# Patient Record
Sex: MALE | Race: WHITE
[De-identification: names, ages, dates, MRNs, and addresses within clinical notes are randomized per-mention and may not be internally consistent; named-entity substitution may affect disease eponyms.]

---

## 2018-02-17 ENCOUNTER — HOSPITAL ENCOUNTER (INPATIENT)
Dept: HOSPITAL 54 - ER | Age: 83
LOS: 5 days | Discharge: HOME | DRG: 840 | End: 2018-02-22
Attending: NURSE PRACTITIONER | Admitting: NURSE PRACTITIONER
Payer: MEDICARE

## 2018-02-17 VITALS — SYSTOLIC BLOOD PRESSURE: 113 MMHG | DIASTOLIC BLOOD PRESSURE: 60 MMHG

## 2018-02-17 VITALS — BODY MASS INDEX: 24.26 KG/M2 | WEIGHT: 189 LBS | HEIGHT: 74 IN

## 2018-02-17 VITALS — DIASTOLIC BLOOD PRESSURE: 60 MMHG | SYSTOLIC BLOOD PRESSURE: 110 MMHG

## 2018-02-17 VITALS — SYSTOLIC BLOOD PRESSURE: 135 MMHG | DIASTOLIC BLOOD PRESSURE: 69 MMHG

## 2018-02-17 VITALS — SYSTOLIC BLOOD PRESSURE: 112 MMHG | DIASTOLIC BLOOD PRESSURE: 62 MMHG

## 2018-02-17 VITALS — SYSTOLIC BLOOD PRESSURE: 129 MMHG | DIASTOLIC BLOOD PRESSURE: 73 MMHG

## 2018-02-17 VITALS — SYSTOLIC BLOOD PRESSURE: 131 MMHG | DIASTOLIC BLOOD PRESSURE: 63 MMHG

## 2018-02-17 DIAGNOSIS — K80.20: ICD-10-CM

## 2018-02-17 DIAGNOSIS — Z85.51: ICD-10-CM

## 2018-02-17 DIAGNOSIS — T38.0X5A: ICD-10-CM

## 2018-02-17 DIAGNOSIS — J96.21: ICD-10-CM

## 2018-02-17 DIAGNOSIS — Z86.718: ICD-10-CM

## 2018-02-17 DIAGNOSIS — C94.6: Primary | ICD-10-CM

## 2018-02-17 DIAGNOSIS — I25.10: ICD-10-CM

## 2018-02-17 DIAGNOSIS — I70.0: ICD-10-CM

## 2018-02-17 DIAGNOSIS — Z92.21: ICD-10-CM

## 2018-02-17 DIAGNOSIS — I26.99: ICD-10-CM

## 2018-02-17 DIAGNOSIS — N40.0: ICD-10-CM

## 2018-02-17 DIAGNOSIS — Z79.01: ICD-10-CM

## 2018-02-17 DIAGNOSIS — K21.9: ICD-10-CM

## 2018-02-17 DIAGNOSIS — E83.42: ICD-10-CM

## 2018-02-17 DIAGNOSIS — Z79.899: ICD-10-CM

## 2018-02-17 DIAGNOSIS — I10: ICD-10-CM

## 2018-02-17 DIAGNOSIS — E44.0: ICD-10-CM

## 2018-02-17 DIAGNOSIS — Z86.711: ICD-10-CM

## 2018-02-17 DIAGNOSIS — Z87.891: ICD-10-CM

## 2018-02-17 DIAGNOSIS — E78.5: ICD-10-CM

## 2018-02-17 DIAGNOSIS — J20.9: ICD-10-CM

## 2018-02-17 DIAGNOSIS — J43.9: ICD-10-CM
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
## 2018-02-17 LAB
ALBUMIN SERPL BCP-MCNC: 3.2 G/DL (ref 3.4–5)
ALP SERPL-CCNC: 72 U/L (ref 46–116)
ALT SERPL W P-5'-P-CCNC: 18 U/L (ref 12–78)
APPEARANCE UR: CLEAR
APTT PPP: 26 SEC (ref 23–34)
AST SERPL W P-5'-P-CCNC: 16 U/L (ref 15–37)
BASE EXCESS BLDA CALC-SCNC: 1.7 MMOL/L
BASOPHILS # BLD AUTO: 0 /CMM (ref 0–0.2)
BASOPHILS NFR BLD AUTO: 0.1 % (ref 0–2)
BILIRUB DIRECT SERPL-MCNC: 0.2 MG/DL (ref 0–0.2)
BILIRUB SERPL-MCNC: 1.1 MG/DL (ref 0.2–1)
BILIRUB UR QL STRIP: NEGATIVE
BUN SERPL-MCNC: 17 MG/DL (ref 7–18)
CALCIUM SERPL-MCNC: 8.8 MG/DL (ref 8.5–10.1)
CHLORIDE SERPL-SCNC: 108 MMOL/L (ref 98–107)
CO2 SERPL-SCNC: 27 MMOL/L (ref 21–32)
COLOR UR: (no result)
CREAT SERPL-MCNC: 0.9 MG/DL (ref 0.6–1.3)
DO-HGB MFR BLDA: 203.4 MMHG
EOSINOPHIL # BLD AUTO: 0.2 /CMM (ref 0–0.7)
EOSINOPHIL NFR BLD AUTO: 1.2 % (ref 0–6)
EOSINOPHIL NFR BLD MANUAL: 1 % (ref 0–4)
GLUCOSE SERPL-MCNC: 118 MG/DL (ref 74–106)
GLUCOSE UR STRIP-MCNC: NEGATIVE MG/DL
HCT VFR BLD AUTO: 50 % (ref 39–51)
HGB BLD-MCNC: 16.5 G/DL (ref 13.5–17.5)
HGB UR QL STRIP: NEGATIVE ERY/UL
INHALED O2 CONCENTRATION: 44 %
INHALED O2 FLOW RATE: 6 L/MIN (ref 0–30)
INR PPP: 1.12 (ref 0.87–1.13)
KETONES UR STRIP-MCNC: (no result) MG/DL
LEUKOCYTE ESTERASE UR QL STRIP: NEGATIVE
LYMPHOCYTES NFR BLD AUTO: 0.6 /CMM (ref 0.8–4.8)
LYMPHOCYTES NFR BLD AUTO: 4.3 % (ref 20–44)
LYMPHOCYTES NFR BLD MANUAL: 3 % (ref 16–48)
MAGNESIUM SERPL-MCNC: 1.7 MG/DL (ref 1.8–2.4)
MCH RBC QN AUTO: 31 PG (ref 26–33)
MCHC RBC AUTO-ENTMCNC: 33 G/DL (ref 31–36)
MCV RBC AUTO: 93 FL (ref 80–96)
MONOCYTES NFR BLD AUTO: 0.1 /CMM (ref 0.1–1.3)
MONOCYTES NFR BLD AUTO: 0.9 % (ref 2–12)
MONOCYTES NFR BLD MANUAL: 1 % (ref 0–11)
NEUTROPHILS # BLD AUTO: 12.2 /CMM (ref 1.8–8.9)
NEUTROPHILS NFR BLD AUTO: 93.5 % (ref 43–81)
NEUTS BAND NFR BLD MANUAL: 10 % (ref 0–5)
NEUTS SEG NFR BLD MANUAL: 85 % (ref 42–76)
NITRITE UR QL STRIP: NEGATIVE
PCO2 TEMP ADJ BLDA: 34.7 MMHG (ref 35–45)
PH TEMP ADJ BLDA: 7.47 [PH] (ref 7.35–7.45)
PH UR STRIP: 5.5 [PH] (ref 5–8)
PLATELET # BLD AUTO: 388 /CMM (ref 150–450)
PO2 TEMP ADJ BLDA: 70.8 MMHG (ref 75–100)
POTASSIUM SERPL-SCNC: 3.8 MMOL/L (ref 3.5–5.1)
PROT SERPL-MCNC: 6.1 G/DL (ref 6.4–8.2)
PROT UR QL STRIP: NEGATIVE MG/DL
RBC # BLD AUTO: 5.39 MIL/UL (ref 4.5–6)
RBC #/AREA URNS HPF: (no result) /HPF (ref 0–2)
RDW COEFFICIENT OF VARIATION: 16.1 (ref 11.5–15)
SAO2 % BLDA: 94.8 % (ref 92–98.5)
SODIUM SERPL-SCNC: 144 MMOL/L (ref 136–145)
TROPONIN I SERPL-MCNC: < 0.017 NG/ML (ref 0–0.06)
UROBILINOGEN UR STRIP-MCNC: 0.2 EU/DL
VENTILATION MODE VENT: (no result)
WBC #/AREA URNS HPF: (no result) /HPF (ref 0–3)
WBC NRBC COR # BLD AUTO: 13.1 K/UL (ref 4.3–11)

## 2018-02-17 PROCEDURE — A4606 OXYGEN PROBE USED W OXIMETER: HCPCS

## 2018-02-17 PROCEDURE — Z7610: HCPCS

## 2018-02-17 RX ADMIN — ALBUTEROL SULFATE SCH MG: 1.25 SOLUTION RESPIRATORY (INHALATION) at 19:33

## 2018-02-17 RX ADMIN — LATANOPROST SCH DROP: 50 SOLUTION OPHTHALMIC at 22:12

## 2018-02-17 RX ADMIN — ENOXAPARIN SODIUM SCH MG: 80 INJECTION SUBCUTANEOUS at 13:40

## 2018-02-17 RX ADMIN — ALBUTEROL SULFATE SCH MG: 2.5 SOLUTION RESPIRATORY (INHALATION) at 13:07

## 2018-02-17 RX ADMIN — MAGNESIUM SULFATE IN DEXTROSE SCH MLS/HR: 10 INJECTION, SOLUTION INTRAVENOUS at 12:49

## 2018-02-17 RX ADMIN — Medication SCH MG: at 19:32

## 2018-02-17 RX ADMIN — Medication SCH MG: at 08:04

## 2018-02-17 RX ADMIN — DEXTROSE MONOHYDRATE SCH MLS/HR: 50 INJECTION, SOLUTION INTRAVENOUS at 10:09

## 2018-02-17 RX ADMIN — ALBUTEROL SULFATE SCH MG: 1.25 SOLUTION RESPIRATORY (INHALATION) at 15:26

## 2018-02-17 RX ADMIN — DEXTROSE MONOHYDRATE SCH MLS/HR: 50 INJECTION, SOLUTION INTRAVENOUS at 10:59

## 2018-02-17 RX ADMIN — Medication SCH MG: at 13:07

## 2018-02-17 RX ADMIN — Medication SCH MG: at 13:00

## 2018-02-17 RX ADMIN — Medication SCH MG: at 15:26

## 2018-02-17 RX ADMIN — MAGNESIUM SULFATE IN DEXTROSE SCH MLS/HR: 10 INJECTION, SOLUTION INTRAVENOUS at 13:39

## 2018-02-17 RX ADMIN — ALBUTEROL SULFATE SCH MG: 1.25 SOLUTION RESPIRATORY (INHALATION) at 22:59

## 2018-02-17 RX ADMIN — ATORVASTATIN CALCIUM SCH MG: 10 TABLET, FILM COATED ORAL at 22:12

## 2018-02-17 RX ADMIN — ALBUTEROL SULFATE SCH MG: 2.5 SOLUTION RESPIRATORY (INHALATION) at 08:03

## 2018-02-17 RX ADMIN — Medication SCH MG: at 22:59

## 2018-02-17 NOTE — NUR
RN ADMITTING ALMA NOTE

ADMITTED 83 YR OLD MALE, FROM ER REPORT GIVEN BY KINJAL. PT FROM HOME, C/C  SOB X 15 MIN AND 
WHEEZING, ADMIT DIAGNOSIS PNA. PT AOX4 ON SIMPLE MASK @ 4L SAT @ 95%, DENIES ANY PAIN, 
AMBULATORY, NO SKIN ISSUES, WITH RAC 18G, ADMITTING ORDERS ENTERED BY ABHILASH LARA NP ON CALL, 
ASSESSMENT TO BE DONE BY AM SHIFT RN, ALL SAFETY MEASURES UNDER TAKEN CL WR. WILL ENDORSE 
FOR ADONIS.

## 2018-02-17 NOTE — NUR
PT BBRA WITH C/O "SOB [id number]"; EXPIRATORY WHEEZES; BREATHING TX PTA." PT IS 
AAOX4. RESP EVEN AND NONE LABORED. SKIN PINK AND WARM. NO S/S OF ACUTE DISTRESS 
NOTED. VSS. WAITING MD FOR EVAL.

## 2018-02-17 NOTE — NUR
TD RN NOTES



RECEIVED PT ON BED SLEEPING. ALERT ORIENTED X4. ON FACE MASK 4L SATURATING WELL. ON TELE 
MONITOR SR 77. IV ACCESS RA #18 RUNNING WELL. HEAD OF BED ELEVATED SIDE RAILS UP. CALL LIGHT 
WITHIN REACH. WILL CONTINUE TO MONITOR PT CLOSELY.

## 2018-02-17 NOTE — NUR
RN NOTES



RECEIVED PATIENT AWAKE IN BED WITH NO RESPIRATORY DISTRESS OR SHORTNESS OF BREATH. BREATHING 
EVEN AND UNLABORED. ALERT AND ORIENTED, VERBALLY ABLE TO COMMUNICATE NEEDS. NO COMPLAINT OF 
PAIN OR DISCOMFORT. ON O2 AT 6LPM VIA NC WELL TOLERATED. AMBULATORY. VITAL SIGNS WNL. KEPT 
CLEAN AND DRY. WILL CONTINUE TO MONITOR.

## 2018-02-17 NOTE — NUR
TD RN NOTES



NO ACUTE CHANGES NOTED DURING THE SHIFT. PROVIDED COMFORT AND SAFETY. HEAD OF BED ELEVATED. 
DUE MEDS GIVEN.

## 2018-02-18 VITALS — DIASTOLIC BLOOD PRESSURE: 54 MMHG | SYSTOLIC BLOOD PRESSURE: 119 MMHG

## 2018-02-18 VITALS — DIASTOLIC BLOOD PRESSURE: 79 MMHG | SYSTOLIC BLOOD PRESSURE: 149 MMHG

## 2018-02-18 VITALS — DIASTOLIC BLOOD PRESSURE: 64 MMHG | SYSTOLIC BLOOD PRESSURE: 117 MMHG

## 2018-02-18 VITALS — DIASTOLIC BLOOD PRESSURE: 85 MMHG | SYSTOLIC BLOOD PRESSURE: 167 MMHG

## 2018-02-18 VITALS — DIASTOLIC BLOOD PRESSURE: 72 MMHG | SYSTOLIC BLOOD PRESSURE: 156 MMHG

## 2018-02-18 VITALS — SYSTOLIC BLOOD PRESSURE: 152 MMHG | DIASTOLIC BLOOD PRESSURE: 81 MMHG

## 2018-02-18 LAB
BASOPHILS # BLD AUTO: 0 /CMM (ref 0–0.2)
BASOPHILS NFR BLD AUTO: 0 % (ref 0–2)
BUN SERPL-MCNC: 19 MG/DL (ref 7–18)
CALCIUM SERPL-MCNC: 8.8 MG/DL (ref 8.5–10.1)
CHLORIDE SERPL-SCNC: 108 MMOL/L (ref 98–107)
CHOLEST SERPL-MCNC: 123 MG/DL (ref ?–200)
CO2 SERPL-SCNC: 32 MMOL/L (ref 21–32)
CREAT SERPL-MCNC: 1 MG/DL (ref 0.6–1.3)
EOSINOPHIL # BLD AUTO: 0 /CMM (ref 0–0.7)
EOSINOPHIL NFR BLD AUTO: 0 % (ref 0–6)
GLUCOSE SERPL-MCNC: 148 MG/DL (ref 74–106)
HCT VFR BLD AUTO: 49 % (ref 39–51)
HDLC SERPL-MCNC: 50 MG/DL (ref 40–60)
HGB BLD-MCNC: 16.1 G/DL (ref 13.5–17.5)
LDLC SERPL DIRECT ASSAY-MCNC: 70 MG/DL (ref 0–99)
LYMPHOCYTES NFR BLD AUTO: 0.4 /CMM (ref 0.8–4.8)
LYMPHOCYTES NFR BLD AUTO: 2 % (ref 20–44)
LYMPHOCYTES NFR BLD MANUAL: 3 % (ref 16–48)
MAGNESIUM SERPL-MCNC: 2.2 MG/DL (ref 1.8–2.4)
MCH RBC QN AUTO: 31 PG (ref 26–33)
MCHC RBC AUTO-ENTMCNC: 33 G/DL (ref 31–36)
MCV RBC AUTO: 93 FL (ref 80–96)
MONOCYTES NFR BLD AUTO: 0.3 /CMM (ref 0.1–1.3)
MONOCYTES NFR BLD AUTO: 1.5 % (ref 2–12)
MONOCYTES NFR BLD MANUAL: 2 % (ref 0–11)
NEUTROPHILS # BLD AUTO: 18.6 /CMM (ref 1.8–8.9)
NEUTROPHILS NFR BLD AUTO: 96.5 % (ref 43–81)
NEUTS SEG NFR BLD MANUAL: 95 % (ref 42–76)
PHOSPHATE SERPL-MCNC: 3.5 MG/DL (ref 2.5–4.9)
PLATELET # BLD AUTO: 387 /CMM (ref 150–450)
POTASSIUM SERPL-SCNC: 4.2 MMOL/L (ref 3.5–5.1)
RBC # BLD AUTO: 5.25 MIL/UL (ref 4.5–6)
RDW COEFFICIENT OF VARIATION: 16.2 (ref 11.5–15)
SODIUM SERPL-SCNC: 142 MMOL/L (ref 136–145)
TRIGL SERPL-MCNC: 37 MG/DL (ref 30–150)
TSH SERPL DL<=0.005 MIU/L-ACNC: 0.37 UIU/ML (ref 0.36–3.74)
WBC NRBC COR # BLD AUTO: 19.2 K/UL (ref 4.3–11)

## 2018-02-18 RX ADMIN — Medication SCH MG: at 22:47

## 2018-02-18 RX ADMIN — ALBUTEROL SULFATE SCH MG: 1.25 SOLUTION RESPIRATORY (INHALATION) at 03:35

## 2018-02-18 RX ADMIN — ENOXAPARIN SODIUM SCH MG: 80 INJECTION SUBCUTANEOUS at 12:06

## 2018-02-18 RX ADMIN — LOSARTAN POTASSIUM SCH MG: 50 TABLET, FILM COATED ORAL at 08:06

## 2018-02-18 RX ADMIN — FINASTERIDE SCH MG: 5 TABLET, FILM COATED ORAL at 08:06

## 2018-02-18 RX ADMIN — ALBUTEROL SULFATE SCH MG: 1.25 SOLUTION RESPIRATORY (INHALATION) at 15:42

## 2018-02-18 RX ADMIN — Medication SCH MG: at 03:35

## 2018-02-18 RX ADMIN — LATANOPROST SCH DROP: 50 SOLUTION OPHTHALMIC at 21:52

## 2018-02-18 RX ADMIN — ENOXAPARIN SODIUM SCH MG: 80 INJECTION SUBCUTANEOUS at 01:27

## 2018-02-18 RX ADMIN — ALBUTEROL SULFATE SCH MG: 1.25 SOLUTION RESPIRATORY (INHALATION) at 07:16

## 2018-02-18 RX ADMIN — ATORVASTATIN CALCIUM SCH MG: 10 TABLET, FILM COATED ORAL at 21:52

## 2018-02-18 RX ADMIN — Medication SCH MG: at 07:16

## 2018-02-18 RX ADMIN — ALBUTEROL SULFATE SCH MG: 1.25 SOLUTION RESPIRATORY (INHALATION) at 19:31

## 2018-02-18 RX ADMIN — Medication SCH MG: at 15:42

## 2018-02-18 RX ADMIN — HYDROXYUREA SCH MG: 500 CAPSULE ORAL at 08:06

## 2018-02-18 RX ADMIN — ALBUTEROL SULFATE SCH MG: 1.25 SOLUTION RESPIRATORY (INHALATION) at 22:47

## 2018-02-18 RX ADMIN — ALBUTEROL SULFATE SCH MG: 1.25 SOLUTION RESPIRATORY (INHALATION) at 11:31

## 2018-02-18 RX ADMIN — DEXTROSE MONOHYDRATE SCH MLS/HR: 50 INJECTION, SOLUTION INTRAVENOUS at 11:10

## 2018-02-18 RX ADMIN — Medication SCH MG: at 19:31

## 2018-02-18 RX ADMIN — TAMSULOSIN HYDROCHLORIDE SCH MG: 0.4 CAPSULE ORAL at 08:05

## 2018-02-18 RX ADMIN — Medication SCH MG: at 11:31

## 2018-02-18 RX ADMIN — DEXTROSE MONOHYDRATE SCH MLS/HR: 50 INJECTION, SOLUTION INTRAVENOUS at 09:16

## 2018-02-18 NOTE — NUR
TELE RN NOTE 

  DR PAYAN AT BEDSIDE , SEEING PATIENT , ALL NEEDS ATTENDED, WILL CONT TO MONITOR CLOSELY

## 2018-02-18 NOTE — NUR
TELE RN  NOTE

 RESTING COMFORTABLY IN BED, ALL NEEDS ATTENDED, NOT IN ACUTE DISTRESS, WILL CONT  TO 
MONITOR CLOSELY

## 2018-02-18 NOTE — NUR
RN OPENING NOTES



RECEIVED REPORT FROM DAYSHIFT RN. FOUND Pt AWAKE, RESTING IN BED. NO S/S OF ACUTE DISTRESS 
OR SOB NOTED. Pt IS A/OX4, VERBAL, ABLE TO MAKE NEEDS KNOWN. Pt C/O NO PAIN AT THIS TIME. IV 
ACCESS ON MARQUITA MIDLINE & RA #18G, SL. SAFETY MEASURES IN PLACE. BED LOW, LOCKED, HOB ELEVATED 
SIDE RAILS UP, CALL LIGHT AND BEDSIDE TABLE WITHIN REACH. WILL CONTINUE TO MONITOR Pt 
THROUGHOUT THE NIGHT FOR SAFETY.

## 2018-02-18 NOTE — NUR
TELE RN NOTE 

  HAVING DINNER, DAUGHTER AT BEDSIDE , ALL NEEDS ATTENDED, WILL CONT  TO MONITOR CLOSELY

## 2018-02-18 NOTE — NUR
ANDRES RN NOTES



RECEIVED PATIENT AWAKE IN BED WITH NO RESPIRATORY DISTRESS OR SHORTNESS OF BREATH. BREATHING 
EVEN AND UNLABORED. ALERT AND ORIENTED, VERBALLY ABLE TO COMMUNICATE NEEDS. NO COMPLAINT OF 
PAIN OR DISCOMFORT. ON O2 AT 6LPM VIA NC WELL TOLERATED. SIGNS WNL. KEPT CLEAN AND DRY. WILL 
CONTINUE TO MONITOR. ON TELE MONITOR SR .  LT UPPER ARM MID LINE IN PLACE , RT FA HL INTACT 
,  BED IN LOWEST AND LOCKED POSITION , PLAN OF CARE DISCUSSED WITH PATIENT

## 2018-02-19 VITALS — SYSTOLIC BLOOD PRESSURE: 152 MMHG | DIASTOLIC BLOOD PRESSURE: 81 MMHG

## 2018-02-19 VITALS — DIASTOLIC BLOOD PRESSURE: 83 MMHG | SYSTOLIC BLOOD PRESSURE: 160 MMHG

## 2018-02-19 VITALS — DIASTOLIC BLOOD PRESSURE: 76 MMHG | SYSTOLIC BLOOD PRESSURE: 146 MMHG

## 2018-02-19 VITALS — SYSTOLIC BLOOD PRESSURE: 140 MMHG | DIASTOLIC BLOOD PRESSURE: 71 MMHG

## 2018-02-19 VITALS — SYSTOLIC BLOOD PRESSURE: 145 MMHG | DIASTOLIC BLOOD PRESSURE: 73 MMHG

## 2018-02-19 VITALS — DIASTOLIC BLOOD PRESSURE: 73 MMHG | SYSTOLIC BLOOD PRESSURE: 145 MMHG

## 2018-02-19 VITALS — SYSTOLIC BLOOD PRESSURE: 138 MMHG | DIASTOLIC BLOOD PRESSURE: 73 MMHG

## 2018-02-19 LAB
BASOPHILS # BLD AUTO: 0 /CMM (ref 0–0.2)
BASOPHILS NFR BLD AUTO: 0.2 % (ref 0–2)
BUN SERPL-MCNC: 22 MG/DL (ref 7–18)
CALCIUM SERPL-MCNC: 8.5 MG/DL (ref 8.5–10.1)
CHLORIDE SERPL-SCNC: 109 MMOL/L (ref 98–107)
CO2 SERPL-SCNC: 27 MMOL/L (ref 21–32)
CREAT SERPL-MCNC: 0.9 MG/DL (ref 0.6–1.3)
EOSINOPHIL # BLD AUTO: 0 /CMM (ref 0–0.7)
EOSINOPHIL NFR BLD AUTO: 0 % (ref 0–6)
GLUCOSE SERPL-MCNC: 116 MG/DL (ref 74–106)
HCT VFR BLD AUTO: 49 % (ref 39–51)
HGB BLD-MCNC: 16.1 G/DL (ref 13.5–17.5)
LYMPHOCYTES NFR BLD AUTO: 0.5 /CMM (ref 0.8–4.8)
LYMPHOCYTES NFR BLD AUTO: 2.7 % (ref 20–44)
LYMPHOCYTES NFR BLD MANUAL: 3 % (ref 16–48)
MAGNESIUM SERPL-MCNC: 2.1 MG/DL (ref 1.8–2.4)
MCH RBC QN AUTO: 31 PG (ref 26–33)
MCHC RBC AUTO-ENTMCNC: 33 G/DL (ref 31–36)
MCV RBC AUTO: 93 FL (ref 80–96)
MONOCYTES NFR BLD AUTO: 0.4 /CMM (ref 0.1–1.3)
MONOCYTES NFR BLD AUTO: 2.3 % (ref 2–12)
MONOCYTES NFR BLD MANUAL: 2 % (ref 0–11)
NEUTROPHILS # BLD AUTO: 18.4 /CMM (ref 1.8–8.9)
NEUTROPHILS NFR BLD AUTO: 94.8 % (ref 43–81)
NEUTS SEG NFR BLD MANUAL: 95 % (ref 42–76)
PHOSPHATE SERPL-MCNC: 3.8 MG/DL (ref 2.5–4.9)
PLATELET # BLD AUTO: 390 /CMM (ref 150–450)
POTASSIUM SERPL-SCNC: 4.5 MMOL/L (ref 3.5–5.1)
RBC # BLD AUTO: 5.27 MIL/UL (ref 4.5–6)
RDW COEFFICIENT OF VARIATION: 17 (ref 11.5–15)
SODIUM SERPL-SCNC: 143 MMOL/L (ref 136–145)
WBC NRBC COR # BLD AUTO: 19.4 K/UL (ref 4.3–11)

## 2018-02-19 RX ADMIN — HYDROXYUREA SCH MG: 500 CAPSULE ORAL at 08:05

## 2018-02-19 RX ADMIN — ALBUTEROL SULFATE SCH MG: 1.25 SOLUTION RESPIRATORY (INHALATION) at 02:34

## 2018-02-19 RX ADMIN — LOSARTAN POTASSIUM SCH MG: 50 TABLET, FILM COATED ORAL at 08:07

## 2018-02-19 RX ADMIN — TAMSULOSIN HYDROCHLORIDE SCH MG: 0.4 CAPSULE ORAL at 08:05

## 2018-02-19 RX ADMIN — ALBUTEROL SULFATE SCH MG: 1.25 SOLUTION RESPIRATORY (INHALATION) at 14:17

## 2018-02-19 RX ADMIN — Medication SCH MG: at 19:57

## 2018-02-19 RX ADMIN — APIXABAN SCH MG: 5 TABLET, FILM COATED ORAL at 16:34

## 2018-02-19 RX ADMIN — APIXABAN SCH MG: 5 TABLET, FILM COATED ORAL at 11:33

## 2018-02-19 RX ADMIN — Medication SCH MG: at 23:38

## 2018-02-19 RX ADMIN — Medication SCH MG: at 11:12

## 2018-02-19 RX ADMIN — DEXTROSE MONOHYDRATE SCH MLS/HR: 50 INJECTION, SOLUTION INTRAVENOUS at 10:20

## 2018-02-19 RX ADMIN — FINASTERIDE SCH MG: 5 TABLET, FILM COATED ORAL at 08:05

## 2018-02-19 RX ADMIN — ALBUTEROL SULFATE SCH MG: 1.25 SOLUTION RESPIRATORY (INHALATION) at 19:57

## 2018-02-19 RX ADMIN — Medication SCH MG: at 02:34

## 2018-02-19 RX ADMIN — Medication SCH MG: at 07:22

## 2018-02-19 RX ADMIN — ALBUTEROL SULFATE SCH MG: 1.25 SOLUTION RESPIRATORY (INHALATION) at 23:38

## 2018-02-19 RX ADMIN — LATANOPROST SCH DROP: 50 SOLUTION OPHTHALMIC at 22:17

## 2018-02-19 RX ADMIN — ENOXAPARIN SODIUM SCH MG: 80 INJECTION SUBCUTANEOUS at 00:25

## 2018-02-19 RX ADMIN — DEXTROSE MONOHYDRATE SCH MLS/HR: 50 INJECTION, SOLUTION INTRAVENOUS at 11:17

## 2018-02-19 RX ADMIN — ATORVASTATIN CALCIUM SCH MG: 10 TABLET, FILM COATED ORAL at 22:12

## 2018-02-19 RX ADMIN — ALBUTEROL SULFATE SCH MG: 1.25 SOLUTION RESPIRATORY (INHALATION) at 07:22

## 2018-02-19 RX ADMIN — Medication SCH MG: at 14:16

## 2018-02-19 RX ADMIN — ALBUTEROL SULFATE SCH MG: 1.25 SOLUTION RESPIRATORY (INHALATION) at 11:12

## 2018-02-19 NOTE — NUR
RN CLOSING NOTES



NO SIGNIFICANT CHANGES IN Pt's CONDITION. Pt REMAINS STABLE AT THIS TIME. NO S/S OF ACUTE 
DISTRESS OR SOB NOTED. TELE READING SR. ALL NEEDS MET AND ATTENDED TO. SAFETY MEASURES IN 
PLACE. WILL ENDORSE TO DAYSHIFT RN FOR Pt's ADONIS.

## 2018-02-19 NOTE — NUR
MS/RN   Rounds



Patient kept up to date with plan of care. Time allowed to answer all questions and address 
all concerns.

## 2018-02-19 NOTE — NUR
MS/RN   Patient received



Patient received from night shift.

A/X X4, appears in no distress, currently receiving HHN, saturation 95%. Denies any pain or 
discomfort. 

Call light within reach, side rails X2 in upright position, brakes locked. Will continue to 
monitor and ensure safety.

## 2018-02-19 NOTE — NUR
TELE/RN NOTES



RECEIVED REPORT FROM RN URMILA. RECEIVED PT. LYING IN BED RESTING. PT. IS EASILY AROUSABLE 
TO NAME. AWAKE, ALERT AND ORIENTED X4. BREATHING EVEN AND UNLABORED ON 4LPM O2 VIA NC. NO 
SOB, RESPIRATORY DISTRESS OR COMPLAINTS OF PAIN NOTED AT THIS TIME. PT. WITH EXTERNAL 
CARDIAC MONITOR PRESENT AND INTACT. CURRENT RHYTHM = SINUS RHYTHM HR 75. PT. WITH LEFT HAND 
20 GAUGE IV SALINE LOCK PRESENT, PATENT AND INTACT. PT. WITH RIGHT FOREARM IV SALINE LOCK 
PRESENT, PATENT AND INTACT. BED LOCKED AND IN LOWEST POSITION, SIDE RAILS UP X2, CALL LIGHT 
WITHIN REACH, WILL CONTINUE TO MONITOR.

## 2018-02-19 NOTE — NUR
MS/RN   End note



No changes in care at this time, all needs attended. No shortness of breath, saturation has 
remained greater than 94% throughout the shift. All needs attended, will endorse to night 
shift.

## 2018-02-20 VITALS — SYSTOLIC BLOOD PRESSURE: 133 MMHG | DIASTOLIC BLOOD PRESSURE: 84 MMHG

## 2018-02-20 VITALS — DIASTOLIC BLOOD PRESSURE: 80 MMHG | SYSTOLIC BLOOD PRESSURE: 156 MMHG

## 2018-02-20 VITALS — DIASTOLIC BLOOD PRESSURE: 85 MMHG | SYSTOLIC BLOOD PRESSURE: 165 MMHG

## 2018-02-20 VITALS — DIASTOLIC BLOOD PRESSURE: 84 MMHG | SYSTOLIC BLOOD PRESSURE: 162 MMHG

## 2018-02-20 VITALS — DIASTOLIC BLOOD PRESSURE: 79 MMHG | SYSTOLIC BLOOD PRESSURE: 167 MMHG

## 2018-02-20 VITALS — DIASTOLIC BLOOD PRESSURE: 89 MMHG | SYSTOLIC BLOOD PRESSURE: 169 MMHG

## 2018-02-20 VITALS — SYSTOLIC BLOOD PRESSURE: 158 MMHG | DIASTOLIC BLOOD PRESSURE: 82 MMHG

## 2018-02-20 LAB
BASE EXCESS BLDA CALC-SCNC: 1.2 MMOL/L
BUN SERPL-MCNC: 23 MG/DL (ref 7–18)
CALCIUM SERPL-MCNC: 8.6 MG/DL (ref 8.5–10.1)
CHLORIDE SERPL-SCNC: 108 MMOL/L (ref 98–107)
CO2 SERPL-SCNC: 29 MMOL/L (ref 21–32)
CREAT SERPL-MCNC: 0.9 MG/DL (ref 0.6–1.3)
DO-HGB MFR BLDA: 93 MMHG
GLUCOSE SERPL-MCNC: 121 MG/DL (ref 74–106)
INHALED O2 CONCENTRATION: 28 %
INHALED O2 FLOW RATE: 2 L/MIN (ref 0–30)
MAGNESIUM SERPL-MCNC: 1.9 MG/DL (ref 1.8–2.4)
PCO2 TEMP ADJ BLDA: 38 MMHG (ref 35–45)
PH TEMP ADJ BLDA: 7.44 [PH] (ref 7.35–7.45)
PHOSPHATE SERPL-MCNC: 3.7 MG/DL (ref 2.5–4.9)
PO2 TEMP ADJ BLDA: 61.8 MMHG (ref 75–100)
POTASSIUM SERPL-SCNC: 4.3 MMOL/L (ref 3.5–5.1)
SAO2 % BLDA: 92.1 % (ref 92–98.5)
SODIUM SERPL-SCNC: 144 MMOL/L (ref 136–145)
VENTILATION MODE VENT: (no result)

## 2018-02-20 RX ADMIN — HYDROXYUREA SCH MG: 500 CAPSULE ORAL at 08:29

## 2018-02-20 RX ADMIN — Medication SCH MG: at 19:51

## 2018-02-20 RX ADMIN — ALBUTEROL SULFATE SCH MG: 1.25 SOLUTION RESPIRATORY (INHALATION) at 15:30

## 2018-02-20 RX ADMIN — ALBUTEROL SULFATE SCH MG: 1.25 SOLUTION RESPIRATORY (INHALATION) at 07:23

## 2018-02-20 RX ADMIN — LOSARTAN POTASSIUM SCH MG: 50 TABLET, FILM COATED ORAL at 08:31

## 2018-02-20 RX ADMIN — ALBUTEROL SULFATE SCH MG: 1.25 SOLUTION RESPIRATORY (INHALATION) at 23:48

## 2018-02-20 RX ADMIN — ATORVASTATIN CALCIUM SCH MG: 10 TABLET, FILM COATED ORAL at 21:12

## 2018-02-20 RX ADMIN — Medication SCH MG: at 23:48

## 2018-02-20 RX ADMIN — TAMSULOSIN HYDROCHLORIDE SCH MG: 0.4 CAPSULE ORAL at 08:30

## 2018-02-20 RX ADMIN — FINASTERIDE SCH MG: 5 TABLET, FILM COATED ORAL at 08:29

## 2018-02-20 RX ADMIN — Medication SCH MG: at 15:30

## 2018-02-20 RX ADMIN — ALBUTEROL SULFATE SCH MG: 1.25 SOLUTION RESPIRATORY (INHALATION) at 19:51

## 2018-02-20 RX ADMIN — APIXABAN SCH MG: 5 TABLET, FILM COATED ORAL at 17:08

## 2018-02-20 RX ADMIN — Medication SCH MG: at 10:57

## 2018-02-20 RX ADMIN — ALBUTEROL SULFATE SCH MG: 1.25 SOLUTION RESPIRATORY (INHALATION) at 10:57

## 2018-02-20 RX ADMIN — APIXABAN SCH MG: 5 TABLET, FILM COATED ORAL at 08:30

## 2018-02-20 RX ADMIN — LATANOPROST SCH DROP: 50 SOLUTION OPHTHALMIC at 21:13

## 2018-02-20 RX ADMIN — Medication SCH MG: at 07:23

## 2018-02-20 RX ADMIN — DEXTROSE MONOHYDRATE SCH MLS/HR: 50 INJECTION, SOLUTION INTRAVENOUS at 11:19

## 2018-02-20 RX ADMIN — Medication SCH MG: at 03:31

## 2018-02-20 RX ADMIN — DEXTROSE MONOHYDRATE SCH MLS/HR: 50 INJECTION, SOLUTION INTRAVENOUS at 09:24

## 2018-02-20 RX ADMIN — ALBUTEROL SULFATE SCH MG: 1.25 SOLUTION RESPIRATORY (INHALATION) at 03:31

## 2018-02-20 NOTE — NUR
RN NOTE:(INITIAL)



PATIENT RECEIVED ALERT AWAKE ORIENTED X4. ON 4LPM OXYGEN, DENIES SHORTNESS OF BREATH. ON 
TELE MONITOR SINUS RHYTHM. DENIES CHEST PAIN & DISCOMFORT. IV SITE INTACT, SALINE LOCK. 
SAFETY MEASURES OBSERVED. CALL LIGHT WITHIN REACH. WILL CONTINUE TO MONITOR.

## 2018-02-20 NOTE — NUR
TELE RN OPENING NOTES

RECEIVED PT IN BED AWAKE, ALERT, VERBALLY RESPONSIVE. ON O2 VIA N/C AT 2L/MIN, RESPIRATIONS 
EVEN, UNLABORED, NO APPARENT DISTRESS NOTED. SR 84. DENIES ANY PAIN OR DISCOMFORT AT THIS 
TIME. CALL LIGHT WITHIN REACH. ATTENDED ALL NEEDS WILL CONTINUE TO MONITOR ACCORDINGLY.

## 2018-02-20 NOTE — NUR
RN NOTE: (SBP >160)



KIMBER N.P. AWARE ABOUT SBP REMAINS >160 DURING SHIFT, NO NEW ORDERS. CONTINUE TO MONITOR.

## 2018-02-20 NOTE — NUR
RN NOTE:



1000:SEEN BY DR. BASHIR AT BEDSIDE, RECEIVED ORDERS TO TITRATE O2 TO 2LPM, MONITOR SPO2. 

10:25:  RECEIVED ABG RESULTS PO2 61.8MMHG ON 2LPM O2 VIA NC. , RELAYED RESULTS TO DR. BASHIR, 
NO NEW ORDERS RECEIVED. CONTINUE TO ON 2LPM O2. 

CONTINUE TO MONITOR CLOSELY.

## 2018-02-21 VITALS — SYSTOLIC BLOOD PRESSURE: 148 MMHG | DIASTOLIC BLOOD PRESSURE: 83 MMHG

## 2018-02-21 VITALS — SYSTOLIC BLOOD PRESSURE: 150 MMHG | DIASTOLIC BLOOD PRESSURE: 79 MMHG

## 2018-02-21 VITALS — DIASTOLIC BLOOD PRESSURE: 64 MMHG | SYSTOLIC BLOOD PRESSURE: 124 MMHG

## 2018-02-21 VITALS — SYSTOLIC BLOOD PRESSURE: 139 MMHG | DIASTOLIC BLOOD PRESSURE: 75 MMHG

## 2018-02-21 VITALS — DIASTOLIC BLOOD PRESSURE: 83 MMHG | SYSTOLIC BLOOD PRESSURE: 148 MMHG

## 2018-02-21 VITALS — SYSTOLIC BLOOD PRESSURE: 138 MMHG | DIASTOLIC BLOOD PRESSURE: 84 MMHG

## 2018-02-21 VITALS — DIASTOLIC BLOOD PRESSURE: 84 MMHG | SYSTOLIC BLOOD PRESSURE: 139 MMHG

## 2018-02-21 LAB
BASOPHILS # BLD AUTO: 0 /CMM (ref 0–0.2)
BASOPHILS NFR BLD AUTO: 0 % (ref 0–2)
BUN SERPL-MCNC: 28 MG/DL (ref 7–18)
CALCIUM SERPL-MCNC: 8.8 MG/DL (ref 8.5–10.1)
CHLORIDE SERPL-SCNC: 109 MMOL/L (ref 98–107)
CO2 SERPL-SCNC: 29 MMOL/L (ref 21–32)
CREAT SERPL-MCNC: 0.9 MG/DL (ref 0.6–1.3)
EOSINOPHIL # BLD AUTO: 0 /CMM (ref 0–0.7)
EOSINOPHIL NFR BLD AUTO: 0 % (ref 0–6)
GLUCOSE SERPL-MCNC: 124 MG/DL (ref 74–106)
HCT VFR BLD AUTO: 50 % (ref 39–51)
HGB BLD-MCNC: 16.7 G/DL (ref 13.5–17.5)
LYMPHOCYTES NFR BLD AUTO: 0.4 /CMM (ref 0.8–4.8)
LYMPHOCYTES NFR BLD AUTO: 2.4 % (ref 20–44)
LYMPHOCYTES NFR BLD MANUAL: 2 % (ref 16–48)
MCH RBC QN AUTO: 31 PG (ref 26–33)
MCHC RBC AUTO-ENTMCNC: 33 G/DL (ref 31–36)
MCV RBC AUTO: 94 FL (ref 80–96)
MONOCYTES NFR BLD AUTO: 0.3 /CMM (ref 0.1–1.3)
MONOCYTES NFR BLD AUTO: 2.1 % (ref 2–12)
MONOCYTES NFR BLD MANUAL: 5 % (ref 0–11)
NEUTROPHILS # BLD AUTO: 14.5 /CMM (ref 1.8–8.9)
NEUTROPHILS NFR BLD AUTO: 95.5 % (ref 43–81)
NEUTS SEG NFR BLD MANUAL: 93 % (ref 42–76)
PLATELET # BLD AUTO: 340 /CMM (ref 150–450)
POTASSIUM SERPL-SCNC: 4.1 MMOL/L (ref 3.5–5.1)
RBC # BLD AUTO: 5.34 MIL/UL (ref 4.5–6)
RDW COEFFICIENT OF VARIATION: 16.8 (ref 11.5–15)
SODIUM SERPL-SCNC: 145 MMOL/L (ref 136–145)
WBC NRBC COR # BLD AUTO: 15.2 K/UL (ref 4.3–11)

## 2018-02-21 RX ADMIN — ALBUTEROL SULFATE SCH MG: 1.25 SOLUTION RESPIRATORY (INHALATION) at 15:23

## 2018-02-21 RX ADMIN — ATORVASTATIN CALCIUM SCH MG: 10 TABLET, FILM COATED ORAL at 21:12

## 2018-02-21 RX ADMIN — ALBUTEROL SULFATE SCH MG: 1.25 SOLUTION RESPIRATORY (INHALATION) at 07:37

## 2018-02-21 RX ADMIN — Medication SCH MG: at 19:48

## 2018-02-21 RX ADMIN — APIXABAN SCH MG: 5 TABLET, FILM COATED ORAL at 16:04

## 2018-02-21 RX ADMIN — Medication SCH MG: at 23:11

## 2018-02-21 RX ADMIN — Medication SCH MG: at 07:37

## 2018-02-21 RX ADMIN — Medication SCH MG: at 11:22

## 2018-02-21 RX ADMIN — LATANOPROST SCH DROP: 50 SOLUTION OPHTHALMIC at 21:12

## 2018-02-21 RX ADMIN — DEXTROSE MONOHYDRATE SCH MLS/HR: 50 INJECTION, SOLUTION INTRAVENOUS at 10:14

## 2018-02-21 RX ADMIN — APIXABAN SCH MG: 5 TABLET, FILM COATED ORAL at 09:04

## 2018-02-21 RX ADMIN — ALBUTEROL SULFATE SCH MG: 1.25 SOLUTION RESPIRATORY (INHALATION) at 23:11

## 2018-02-21 RX ADMIN — HYDROXYUREA SCH MG: 500 CAPSULE ORAL at 09:04

## 2018-02-21 RX ADMIN — ALBUTEROL SULFATE SCH MG: 1.25 SOLUTION RESPIRATORY (INHALATION) at 03:18

## 2018-02-21 RX ADMIN — Medication SCH MG: at 03:18

## 2018-02-21 RX ADMIN — TAMSULOSIN HYDROCHLORIDE SCH MG: 0.4 CAPSULE ORAL at 09:04

## 2018-02-21 RX ADMIN — Medication SCH MG: at 15:23

## 2018-02-21 RX ADMIN — FINASTERIDE SCH MG: 5 TABLET, FILM COATED ORAL at 09:04

## 2018-02-21 RX ADMIN — LOSARTAN POTASSIUM SCH MG: 50 TABLET, FILM COATED ORAL at 09:04

## 2018-02-21 RX ADMIN — DEXTROSE MONOHYDRATE SCH MLS/HR: 50 INJECTION, SOLUTION INTRAVENOUS at 09:04

## 2018-02-21 RX ADMIN — ALBUTEROL SULFATE SCH MG: 1.25 SOLUTION RESPIRATORY (INHALATION) at 11:22

## 2018-02-21 RX ADMIN — ALBUTEROL SULFATE SCH MG: 1.25 SOLUTION RESPIRATORY (INHALATION) at 19:48

## 2018-02-21 NOTE — NUR
TELE RN NOTES



RECEIVED PT ON BED SLEEPING. ALERT ORIENTED X4. ON NASAL CANNULA 2LPM SATURATING WELL. NO 
SIGN OF RESPI DISTRESS. ON TELE MONITOR SR 60. IV ACCESS ON LEFT HAND #20 SL NO REDNESS OR 
SWELLING. HEAD OF BED ELEVATED. SIDE RAILS UP. CALL LIGHT WITHIN REACH. WILL MONITOR PT 
CLOSELY.

## 2018-02-21 NOTE — NUR
TELE RN CLOSING NOTES

PT IN BED, RESTING COMFORTABLY, ON O2 VIA N/C, RESPIRATIONS EVEN, UNLABORED. CALL LIGHT 
WITHIN REACH. DENIES ANY PAIN OR DISCOMFORT.ATTENDED ALL NEEDS.WILL CONTINUE TO MONITOR 
ACCORDINGLY.

## 2018-02-21 NOTE — NUR
MS RN NOTES



NO ACUTE CHANGES NOTED DURING SHIFT. DUE MEDS GIVEN. PROVIDED COMFORT AND SAFETY. WILL 
ENDORSE TO THE PM NURSE FOR ADONIS.

## 2018-02-22 VITALS — DIASTOLIC BLOOD PRESSURE: 85 MMHG | SYSTOLIC BLOOD PRESSURE: 170 MMHG

## 2018-02-22 VITALS — DIASTOLIC BLOOD PRESSURE: 85 MMHG | SYSTOLIC BLOOD PRESSURE: 145 MMHG

## 2018-02-22 VITALS — DIASTOLIC BLOOD PRESSURE: 82 MMHG | SYSTOLIC BLOOD PRESSURE: 157 MMHG

## 2018-02-22 VITALS — SYSTOLIC BLOOD PRESSURE: 139 MMHG | DIASTOLIC BLOOD PRESSURE: 64 MMHG

## 2018-02-22 VITALS — SYSTOLIC BLOOD PRESSURE: 124 MMHG | DIASTOLIC BLOOD PRESSURE: 64 MMHG

## 2018-02-22 RX ADMIN — ALBUTEROL SULFATE SCH MG: 1.25 SOLUTION RESPIRATORY (INHALATION) at 12:09

## 2018-02-22 RX ADMIN — LOSARTAN POTASSIUM SCH MG: 50 TABLET, FILM COATED ORAL at 08:27

## 2018-02-22 RX ADMIN — HYDROXYUREA SCH MG: 500 CAPSULE ORAL at 08:27

## 2018-02-22 RX ADMIN — DEXTROSE MONOHYDRATE SCH MLS/HR: 50 INJECTION, SOLUTION INTRAVENOUS at 11:53

## 2018-02-22 RX ADMIN — Medication SCH MG: at 07:35

## 2018-02-22 RX ADMIN — TAMSULOSIN HYDROCHLORIDE SCH MG: 0.4 CAPSULE ORAL at 08:27

## 2018-02-22 RX ADMIN — ALBUTEROL SULFATE SCH MG: 1.25 SOLUTION RESPIRATORY (INHALATION) at 07:35

## 2018-02-22 RX ADMIN — Medication SCH MG: at 15:23

## 2018-02-22 RX ADMIN — Medication SCH MG: at 12:10

## 2018-02-22 RX ADMIN — DEXTROSE MONOHYDRATE SCH MLS/HR: 50 INJECTION, SOLUTION INTRAVENOUS at 10:17

## 2018-02-22 RX ADMIN — ALBUTEROL SULFATE SCH MG: 1.25 SOLUTION RESPIRATORY (INHALATION) at 03:17

## 2018-02-22 RX ADMIN — Medication SCH MG: at 03:16

## 2018-02-22 RX ADMIN — ALBUTEROL SULFATE SCH MG: 1.25 SOLUTION RESPIRATORY (INHALATION) at 15:23

## 2018-02-22 RX ADMIN — FINASTERIDE SCH MG: 5 TABLET, FILM COATED ORAL at 08:28

## 2018-02-22 RX ADMIN — APIXABAN SCH MG: 5 TABLET, FILM COATED ORAL at 08:28

## 2018-02-22 NOTE — NUR
Rn discharge note 

rn reviewed discharge paperwork with patient and daughter , rn explained f/u appointments 
with pcp and hematologist md poe, patient and family verbalized understanding rn attempted 
to call in prescriptions to [facility] pharmacy [facility]. rn awaiting authorization from pharmacy 
patient sent with hard copys of prescription. patient bilateral wrist iv removed without 
isues , no sob noted at this time patient, discharged without issues

## 2018-02-22 NOTE — NUR
MS RN CLOSING NOTES



PT IN BED, ALERT ET ORIENTED. RESPIRATIONS EVEN ET UNLABORED. VITAL SIGNS TABLE, 
AFEBRILE..RESTING COMFORTABLY, NO APPARENT DISTRESS NOTED. WITH  2 UV HEPLOCK. ADMINISTERED 
ALL MEDS AS ORDERED. DENIES PAIN OR DISCOMFORT THE ENTIRE SHIFT.  CALL LIGHT WITHIN REACH. 
KEPT CLEAN AND COMFORTABLE,  NEEDS ALL ATTENDED.  WILL ENDORSE  ACCORDINGLY FOR CONTINUITY 
OF CARE.

## 2019-04-13 VITALS — SYSTOLIC BLOOD PRESSURE: 96 MMHG | DIASTOLIC BLOOD PRESSURE: 44 MMHG

## 2019-04-14 ENCOUNTER — HOSPITAL ENCOUNTER (INPATIENT)
Dept: HOSPITAL 54 - ER | Age: 84
LOS: 5 days | Discharge: HOME HEALTH | DRG: 871 | End: 2019-04-19
Attending: INTERNAL MEDICINE | Admitting: NURSE PRACTITIONER
Payer: MEDICARE

## 2019-04-14 VITALS — HEIGHT: 68 IN | WEIGHT: 194 LBS | BODY MASS INDEX: 29.4 KG/M2

## 2019-04-14 VITALS — DIASTOLIC BLOOD PRESSURE: 44 MMHG | SYSTOLIC BLOOD PRESSURE: 96 MMHG

## 2019-04-14 DIAGNOSIS — N40.0: ICD-10-CM

## 2019-04-14 DIAGNOSIS — E78.5: ICD-10-CM

## 2019-04-14 DIAGNOSIS — I10: ICD-10-CM

## 2019-04-14 DIAGNOSIS — E87.2: ICD-10-CM

## 2019-04-14 DIAGNOSIS — J15.6: ICD-10-CM

## 2019-04-14 DIAGNOSIS — D68.69: ICD-10-CM

## 2019-04-14 DIAGNOSIS — Z99.81: ICD-10-CM

## 2019-04-14 DIAGNOSIS — E83.39: ICD-10-CM

## 2019-04-14 DIAGNOSIS — D45: ICD-10-CM

## 2019-04-14 DIAGNOSIS — Z86.718: ICD-10-CM

## 2019-04-14 DIAGNOSIS — I26.99: ICD-10-CM

## 2019-04-14 DIAGNOSIS — J98.11: ICD-10-CM

## 2019-04-14 DIAGNOSIS — Z86.711: ICD-10-CM

## 2019-04-14 DIAGNOSIS — D53.9: ICD-10-CM

## 2019-04-14 DIAGNOSIS — Z85.51: ICD-10-CM

## 2019-04-14 DIAGNOSIS — Z79.01: ICD-10-CM

## 2019-04-14 DIAGNOSIS — N17.0: ICD-10-CM

## 2019-04-14 DIAGNOSIS — J96.21: ICD-10-CM

## 2019-04-14 DIAGNOSIS — J44.0: ICD-10-CM

## 2019-04-14 DIAGNOSIS — Z87.891: ICD-10-CM

## 2019-04-14 DIAGNOSIS — E87.6: ICD-10-CM

## 2019-04-14 DIAGNOSIS — Z92.21: ICD-10-CM

## 2019-04-14 DIAGNOSIS — K21.9: ICD-10-CM

## 2019-04-14 DIAGNOSIS — A41.9: Primary | ICD-10-CM

## 2019-04-14 DIAGNOSIS — D72.829: ICD-10-CM

## 2019-04-14 LAB
BASE EXCESS BLDA CALC-SCNC: -3.9 MMOL/L
BASOPHILS # BLD AUTO: 0 /CMM (ref 0–0.2)
BASOPHILS NFR BLD AUTO: 0.4 % (ref 0–2)
BUN SERPL-MCNC: 18 MG/DL (ref 7–18)
CALCIUM SERPL-MCNC: 8.6 MG/DL (ref 8.5–10.1)
CHLORIDE SERPL-SCNC: 107 MMOL/L (ref 98–107)
CO2 SERPL-SCNC: 31 MMOL/L (ref 21–32)
CREAT SERPL-MCNC: 1.3 MG/DL (ref 0.6–1.3)
DO-HGB MFR BLDA: 162.1 MMHG
EOSINOPHIL NFR BLD AUTO: 1 % (ref 0–6)
GLUCOSE SERPL-MCNC: 126 MG/DL (ref 74–106)
HCT VFR BLD AUTO: 50 % (ref 39–51)
HGB BLD-MCNC: 16.2 G/DL (ref 13.5–17.5)
INHALED O2 CONCENTRATION: 36 %
LYMPHOCYTES NFR BLD AUTO: 0.8 /CMM (ref 0.8–4.8)
LYMPHOCYTES NFR BLD AUTO: 7.4 % (ref 20–44)
MCHC RBC AUTO-ENTMCNC: 33 G/DL (ref 31–36)
MCV RBC AUTO: 108 FL (ref 80–96)
MONOCYTES NFR BLD AUTO: 0.1 /CMM (ref 0.1–1.3)
MONOCYTES NFR BLD AUTO: 0.5 % (ref 2–12)
NEUTROPHILS # BLD AUTO: 10.4 /CMM (ref 1.8–8.9)
NEUTROPHILS NFR BLD AUTO: 90.7 % (ref 43–81)
PCO2 TEMP ADJ BLDA: 25.9 MMHG (ref 35–45)
PH TEMP ADJ BLDA: 7.46 [PH] (ref 7.35–7.45)
PLATELET # BLD AUTO: 383 /CMM (ref 150–450)
PO2 TEMP ADJ BLDA: 51.4 MMHG (ref 75–100)
POTASSIUM SERPL-SCNC: 3.9 MMOL/L (ref 3.5–5.1)
RBC # BLD AUTO: 4.6 MIL/UL (ref 4.5–6)
SAO2 % BLDA: 87.9 % (ref 92–98.5)
SODIUM SERPL-SCNC: 144 MMOL/L (ref 136–145)
WBC NRBC COR # BLD AUTO: 11.4 K/UL (ref 4.3–11)

## 2019-04-14 PROCEDURE — G0378 HOSPITAL OBSERVATION PER HR: HCPCS

## 2019-04-14 NOTE — NUR
PT DANNY FROM HOME C/O SOB X1 HR. PT REC'D BREATHING TREATMENT EN ROUTE, MILD 
RELIEF. O2 SAT 88% ROOM AIR, PLACED ON 4L NC O2 SAT 94%. PT AAOX4. NOTED 
TACHYCARDIA, MD AWARE. SKIN WARM AND INTACT. NO ACUTE DISTRESS NOTED AT THIS 
TIME. PLACED IN GOWN AND ON CONTINUOUS CARDIAC MONITOR AND PULSE OX. WILL 
CONTINUE TO MONITOR.

## 2019-04-14 NOTE — NUR
-------------------------------------------------------------------------------

          *** Note undone in [facility] - 04/14/19 at 2354 by QI ***           

-------------------------------------------------------------------------------

RADIOLOGY AT BEDSIDE FOR CXR

## 2019-04-15 VITALS — SYSTOLIC BLOOD PRESSURE: 118 MMHG | DIASTOLIC BLOOD PRESSURE: 53 MMHG

## 2019-04-15 VITALS — DIASTOLIC BLOOD PRESSURE: 69 MMHG | SYSTOLIC BLOOD PRESSURE: 96 MMHG

## 2019-04-15 VITALS — SYSTOLIC BLOOD PRESSURE: 95 MMHG | DIASTOLIC BLOOD PRESSURE: 44 MMHG

## 2019-04-15 VITALS — SYSTOLIC BLOOD PRESSURE: 109 MMHG | DIASTOLIC BLOOD PRESSURE: 49 MMHG

## 2019-04-15 VITALS — DIASTOLIC BLOOD PRESSURE: 67 MMHG | SYSTOLIC BLOOD PRESSURE: 98 MMHG

## 2019-04-15 VITALS — SYSTOLIC BLOOD PRESSURE: 91 MMHG | DIASTOLIC BLOOD PRESSURE: 57 MMHG

## 2019-04-15 LAB
ALBUMIN SERPL BCP-MCNC: 3.4 G/DL (ref 3.4–5)
ALP SERPL-CCNC: 102 U/L (ref 46–116)
ALT SERPL W P-5'-P-CCNC: 23 U/L (ref 12–78)
APPEARANCE UR: CLEAR
AST SERPL W P-5'-P-CCNC: 19 U/L (ref 15–37)
BASOPHILS # BLD AUTO: 0.1 /CMM (ref 0–0.2)
BASOPHILS NFR BLD AUTO: 0.4 % (ref 0–2)
BILIRUB DIRECT SERPL-MCNC: 0.2 MG/DL (ref 0–0.2)
BILIRUB SERPL-MCNC: 0.6 MG/DL (ref 0.2–1)
BILIRUB UR QL STRIP: NEGATIVE
BUN SERPL-MCNC: 17 MG/DL (ref 7–18)
CALCIUM SERPL-MCNC: 7.7 MG/DL (ref 8.5–10.1)
CHLORIDE SERPL-SCNC: 109 MMOL/L (ref 98–107)
CHOLEST SERPL-MCNC: 93 MG/DL (ref ?–200)
CO2 SERPL-SCNC: 24 MMOL/L (ref 21–32)
COLOR UR: YELLOW
CREAT SERPL-MCNC: 1.1 MG/DL (ref 0.6–1.3)
CREAT UR-MCNC: 128.7 MG/DL (ref 30–125)
EOSINOPHIL NFR BLD AUTO: 0 % (ref 0–6)
GLUCOSE SERPL-MCNC: 125 MG/DL (ref 74–106)
GLUCOSE UR STRIP-MCNC: NEGATIVE MG/DL
HCT VFR BLD AUTO: 40 % (ref 39–51)
HDLC SERPL-MCNC: 44 MG/DL (ref 40–60)
HGB BLD-MCNC: 13 G/DL (ref 13.5–17.5)
HGB UR QL STRIP: (no result) ERY/UL
KETONES UR STRIP-MCNC: NEGATIVE MG/DL
LDLC SERPL DIRECT ASSAY-MCNC: 50 MG/DL (ref 0–99)
LEUKOCYTE ESTERASE UR QL STRIP: NEGATIVE
LYMPHOCYTES NFR BLD AUTO: 0.3 /CMM (ref 0.8–4.8)
LYMPHOCYTES NFR BLD AUTO: 1.7 % (ref 20–44)
MAGNESIUM SERPL-MCNC: 1.8 MG/DL (ref 1.8–2.4)
MCHC RBC AUTO-ENTMCNC: 33 G/DL (ref 31–36)
MCV RBC AUTO: 107 FL (ref 80–96)
MONOCYTES NFR BLD AUTO: 1.5 /CMM (ref 0.1–1.3)
MONOCYTES NFR BLD AUTO: 7.5 % (ref 2–12)
NEUTROPHILS # BLD AUTO: 18.1 /CMM (ref 1.8–8.9)
NEUTROPHILS NFR BLD AUTO: 90.4 % (ref 43–81)
NITRITE UR QL STRIP: NEGATIVE
NT-PROBNP SERPL-MCNC: 167 PG/ML (ref 0–125)
PH UR STRIP: 6 [PH] (ref 5–8)
PHOSPHATE SERPL-MCNC: 1.9 MG/DL (ref 2.5–4.9)
PLATELET # BLD AUTO: 308 /CMM (ref 150–450)
POTASSIUM SERPL-SCNC: 3.4 MMOL/L (ref 3.5–5.1)
PROT SERPL-MCNC: 6.8 G/DL (ref 6.4–8.2)
PROT UR QL STRIP: NEGATIVE MG/DL
PROT UR-MCNC: 21.1 MG/DL (ref 0–11.9)
RBC # BLD AUTO: 3.7 MIL/UL (ref 4.5–6)
SODIUM SERPL-SCNC: 141 MMOL/L (ref 136–145)
SODIUM UR-SCNC: 34 MMOL/L (ref 40–220)
TRIGL SERPL-MCNC: 27 MG/DL (ref 30–150)
UROBILINOGEN UR STRIP-MCNC: 0.2 EU/DL
WBC #/AREA URNS HPF: (no result) /HPF (ref 0–3)
WBC NRBC COR # BLD AUTO: 20 K/UL (ref 4.3–11)

## 2019-04-15 RX ADMIN — APIXABAN SCH MG: 5 TABLET, FILM COATED ORAL at 16:39

## 2019-04-15 RX ADMIN — SODIUM CHLORIDE PRN MLS/HR: 9 INJECTION, SOLUTION INTRAVENOUS at 02:30

## 2019-04-15 RX ADMIN — HYDROXYUREA SCH MG: 500 CAPSULE ORAL at 09:02

## 2019-04-15 RX ADMIN — SODIUM CHLORIDE PRN MLS/HR: 9 INJECTION, SOLUTION INTRAVENOUS at 17:03

## 2019-04-15 RX ADMIN — APIXABAN SCH MG: 5 TABLET, FILM COATED ORAL at 09:02

## 2019-04-15 RX ADMIN — Medication SCH MG: at 15:30

## 2019-04-15 RX ADMIN — ATORVASTATIN CALCIUM SCH MG: 40 TABLET, FILM COATED ORAL at 21:39

## 2019-04-15 RX ADMIN — ALBUTEROL SULFATE SCH MG: 1.25 SOLUTION RESPIRATORY (INHALATION) at 19:30

## 2019-04-15 RX ADMIN — TAMSULOSIN HYDROCHLORIDE SCH MG: 0.4 CAPSULE ORAL at 09:02

## 2019-04-15 RX ADMIN — LATANOPROST SCH ML: 50 SOLUTION OPHTHALMIC at 21:39

## 2019-04-15 RX ADMIN — FINASTERIDE SCH MG: 5 TABLET, FILM COATED ORAL at 09:01

## 2019-04-15 RX ADMIN — ALBUTEROL SULFATE SCH MG: 1.25 SOLUTION RESPIRATORY (INHALATION) at 15:30

## 2019-04-15 RX ADMIN — Medication SCH MG: at 19:30

## 2019-04-15 NOTE — NUR
ALMA RN NOTE

GOT CALL FROM RADIOLOGY,REPORT POSITIVE RESULT FOR BILATERAL  PE. MADE AWARE.GOT NEW 
ORDER FOR D/C ELIQUIS AND TO START ON LOVENOX 1MG/KG SQ Q12H TO START 04/16/19 0500.TO 
RECOMMEND TO HAVE  HEMATOLOGY CONSULTATION .

## 2019-04-15 NOTE — NUR
RN ADMISSION NOTES,



RECEIVED 84 YEAR OLD MALE ADMITTED FROM ER DEPARTMENT VIA STRETCHER ACCOMPANIED BY 2 NURSES, 
NO SOB/ACUTE DISTRESS NOTED AT THIS TIME, UNDER MEDICAL SERVICES WHIT JOSE NP WITH 
ADMITTING DX SEPSIS, H/O COPD, RECENTLY DIAGNOSED WITH PNA,H/O  PE, LEFT KNEE DVT, BLADDER 
CA, HTN, HLD, BPH, ANEMIA, FORMER SMOKER, PATIENT A/O X4 ABLE TO VERBALIZED NEEDS AND 
CONCERNS, ON 4LMP VIA NC WITH O2 SAT LEVEL 91%, 98.2, 91/57, 94, SINUS RHYTHM IN TELE 
MONITOR AMBULATORY WITH ASSISTANCE, SACRAL REDNESS NOTED, AFEBRILE AT THIS TIME,  RIGHT FORE 
ARM IV ACCESS 20G, WILL F/U WITH MD FOR FURTHER ORDERS, ALL NEEDS PROVIDED, BED LOCKED AND 
IN LOWEST POSITION, ORIENTED TO ROOM AND TO USE CALL LIGHT FOR ASSISTANCE, WILL CONTINUE TO 
MONITOR CLOSELY.

## 2019-04-15 NOTE — NUR
ALMA RN OPENING NOTES

RECEIVED REPORT FROM PM NURSE.PATIENT AXOX3.WITH  PERIODS OF FRETFULNESS.NO SOB NO DISTRESS 
NOTED.PATIENT ON O2 VIA NASAL CANULA 4L  .ON TELE MONITOR SR WITH OCCASIONAL PVC HR 76.IV ON 
RFA#20 INTACT AND PATENT WITH IVF.BED IS LOW AND IN  LOCKED POSITION.CALL LIGHT IN 
REACH.SRX3.BED ALARM ON.WILL CONTINUE TO MONITOR.

## 2019-04-15 NOTE — NUR
RN  NOTES,



 PATIENT SLEEPING AT THIS TIME, BREATHING EVEN AND UNLABORED, NO SOB/ACUTE DISTRESS NOTED AT 
THIS TIME, , ON 4LMP VIA NC WITH O2 SAT LEVEL >91%,  SINUS RHYTHM IN TELE MONITOR RIGHT FORE 
ARM IV ACCESS 20G, ALL NEEDS PROVIDED, BED LOCKED AND IN LOWEST POSITION, TO SIGNIFICANT 
CHANGE IN CONDITION DURING THE REST OF THE SHIFT,  CALL LIGHT W/I REACH, WILL ENDORSE 
CONTINUITY OF CARE TO ONCOMING NURSE.

## 2019-04-15 NOTE — NUR
ALMA RN OPENING NOTES

RECEIVED REPORT FROM RD BARRIOS. PATIENT A/A/O X3, ABLE TO MAKE NEEDS KNOWN. BREATHING EVEN & 
UNLABORED, TOLERATING O2 @ 4LPM VIA NC. DENIES SOB OR DIFFICULTY BREATHING. ON TELE W/ SINUS 
RHYTHM, HR 70. RIGHT FOREARM IV #20 INTACT & PATENT W/ DRESSING CDI, SALINE LOCKED. DENIES 
ANY PAIN OR DISCOMFORT @ THIS TIME. SAFETY MEASURES IN PLACE W/ SIDE RAILS & BED ALARM ON. 
INSTRUCTED TO USE CALL LIGHT FOR ASSISTANCE. WILL CONTINUE TO MONITOR.

## 2019-04-15 NOTE — NUR
PT UNABLE TO PROVIDE URINE SAMPLE AT THIS TIME. MD AWARE. PER VERBAL MD ORDER, 
WILL ADMINISTER MAXIPIME 2G IV NOW BEFORE URINE.

## 2019-04-15 NOTE — NUR
ISSAC (DAUGHTER) CONTACT INFORMATION: [phone]

ABHILASH (SON) CONTACT INFORMATION: [phone]

## 2019-04-15 NOTE — NUR
ALMA RN CLOSING NOTES

.PATIENT AXOX3.NO SOB NO DISTRESS NOTED.PATIENT ON O2 VIA NASAL CANULA 4L  .ON TELE MONITOR 
SR WITH OCCASIONAL PVC HR 78.IV ON RFA#20 AND LAC#20 INTACT AND PATENT WITH IVF.BED IS LOW 
AND IN  LOCKED POSITION.CALL LIGHT IN REACH.SRX3.BED ALARM ON.NP NEGRETTE MADE AWARE ABOUT 
POSITIVE PE AND HEMATOLOGY RECOMMENDATION FROM PULMONOLOGIST.AWAITING FOR BILATERAL VENOUS 
DOPPLER RESULT.WILL ENDORSE TO PM NURSE FOR ADONIS.

## 2019-04-16 VITALS — DIASTOLIC BLOOD PRESSURE: 65 MMHG | SYSTOLIC BLOOD PRESSURE: 122 MMHG

## 2019-04-16 VITALS — DIASTOLIC BLOOD PRESSURE: 47 MMHG | SYSTOLIC BLOOD PRESSURE: 96 MMHG

## 2019-04-16 VITALS — SYSTOLIC BLOOD PRESSURE: 111 MMHG | DIASTOLIC BLOOD PRESSURE: 59 MMHG

## 2019-04-16 VITALS — DIASTOLIC BLOOD PRESSURE: 63 MMHG | SYSTOLIC BLOOD PRESSURE: 138 MMHG

## 2019-04-16 VITALS — SYSTOLIC BLOOD PRESSURE: 123 MMHG | DIASTOLIC BLOOD PRESSURE: 60 MMHG

## 2019-04-16 LAB
ALBUMIN SERPL BCP-MCNC: 2.4 G/DL (ref 3.4–5)
ALP SERPL-CCNC: 60 U/L (ref 46–116)
ALT SERPL W P-5'-P-CCNC: 19 U/L (ref 12–78)
AST SERPL W P-5'-P-CCNC: 19 U/L (ref 15–37)
BASE EXCESS BLDA CALC-SCNC: -0.9 MMOL/L
BASOPHILS # BLD AUTO: 0 /CMM (ref 0–0.2)
BASOPHILS NFR BLD AUTO: 0.4 % (ref 0–2)
BILIRUB SERPL-MCNC: 0.6 MG/DL (ref 0.2–1)
BUN SERPL-MCNC: 14 MG/DL (ref 7–18)
CALCIUM SERPL-MCNC: 7.8 MG/DL (ref 8.5–10.1)
CHLORIDE SERPL-SCNC: 108 MMOL/L (ref 98–107)
CK SERPL-CCNC: 41 U/L (ref 39–308)
CO2 SERPL-SCNC: 24 MMOL/L (ref 21–32)
CREAT SERPL-MCNC: 0.8 MG/DL (ref 0.6–1.3)
DO-HGB MFR BLDA: 117.3 MMHG
EOSINOPHIL NFR BLD AUTO: 2.1 % (ref 0–6)
GLUCOSE SERPL-MCNC: 98 MG/DL (ref 74–106)
HCT VFR BLD AUTO: 40 % (ref 39–51)
HGB BLD-MCNC: 13.3 G/DL (ref 13.5–17.5)
INHALED O2 CONCENTRATION: 32 %
INHALED O2 FLOW RATE: 3 L/MIN (ref 0–30)
LYMPHOCYTES NFR BLD AUTO: 0.6 /CMM (ref 0.8–4.8)
LYMPHOCYTES NFR BLD AUTO: 7.5 % (ref 20–44)
MAGNESIUM SERPL-MCNC: 1.8 MG/DL (ref 1.8–2.4)
MCHC RBC AUTO-ENTMCNC: 34 G/DL (ref 31–36)
MCV RBC AUTO: 105 FL (ref 80–96)
MONOCYTES NFR BLD AUTO: 1.1 /CMM (ref 0.1–1.3)
MONOCYTES NFR BLD AUTO: 12.5 % (ref 2–12)
NEUTROPHILS # BLD AUTO: 6.5 /CMM (ref 1.8–8.9)
NEUTROPHILS NFR BLD AUTO: 77.5 % (ref 43–81)
PCO2 TEMP ADJ BLDA: 35.5 MMHG (ref 35–45)
PH TEMP ADJ BLDA: 7.43 [PH] (ref 7.35–7.45)
PHOSPHATE SERPL-MCNC: 2.5 MG/DL (ref 2.5–4.9)
PLATELET # BLD AUTO: 269 /CMM (ref 150–450)
PO2 TEMP ADJ BLDA: 69.3 MMHG (ref 75–100)
POTASSIUM SERPL-SCNC: 3.5 MMOL/L (ref 3.5–5.1)
PROT SERPL-MCNC: 5.2 G/DL (ref 6.4–8.2)
RBC # BLD AUTO: 3.78 MIL/UL (ref 4.5–6)
SAO2 % BLDA: 93.6 % (ref 92–98.5)
SODIUM SERPL-SCNC: 142 MMOL/L (ref 136–145)
TSH SERPL DL<=0.005 MIU/L-ACNC: 2.25 UIU/ML (ref 0.36–3.74)
VENTILATION MODE VENT: (no result)
WBC NRBC COR # BLD AUTO: 8.4 K/UL (ref 4.3–11)

## 2019-04-16 RX ADMIN — ALBUTEROL SULFATE SCH MG: 1.25 SOLUTION RESPIRATORY (INHALATION) at 01:28

## 2019-04-16 RX ADMIN — TAMSULOSIN HYDROCHLORIDE SCH MG: 0.4 CAPSULE ORAL at 08:35

## 2019-04-16 RX ADMIN — Medication SCH MG: at 07:35

## 2019-04-16 RX ADMIN — Medication SCH MG: at 13:30

## 2019-04-16 RX ADMIN — Medication SCH MG: at 19:16

## 2019-04-16 RX ADMIN — ALBUTEROL SULFATE SCH MG: 1.25 SOLUTION RESPIRATORY (INHALATION) at 07:35

## 2019-04-16 RX ADMIN — CEFEPIME HYDROCHLORIDE SCH MLS/HR: 1 INJECTION, POWDER, FOR SOLUTION INTRAMUSCULAR; INTRAVENOUS at 12:16

## 2019-04-16 RX ADMIN — SODIUM CHLORIDE PRN MLS/HR: 9 INJECTION, SOLUTION INTRAVENOUS at 16:21

## 2019-04-16 RX ADMIN — ALBUTEROL SULFATE SCH MG: 1.25 SOLUTION RESPIRATORY (INHALATION) at 15:41

## 2019-04-16 RX ADMIN — ATORVASTATIN CALCIUM SCH MG: 40 TABLET, FILM COATED ORAL at 21:39

## 2019-04-16 RX ADMIN — ENOXAPARIN SODIUM SCH MG: 80 INJECTION SUBCUTANEOUS at 05:21

## 2019-04-16 RX ADMIN — Medication SCH MG: at 01:27

## 2019-04-16 RX ADMIN — HYDROXYUREA SCH MG: 500 CAPSULE ORAL at 08:34

## 2019-04-16 RX ADMIN — ALBUTEROL SULFATE SCH MG: 1.25 SOLUTION RESPIRATORY (INHALATION) at 13:30

## 2019-04-16 RX ADMIN — Medication SCH MG: at 15:40

## 2019-04-16 RX ADMIN — FINASTERIDE SCH MG: 5 TABLET, FILM COATED ORAL at 08:36

## 2019-04-16 RX ADMIN — ALBUTEROL SULFATE SCH MG: 1.25 SOLUTION RESPIRATORY (INHALATION) at 19:16

## 2019-04-16 RX ADMIN — ENOXAPARIN SODIUM SCH MG: 80 INJECTION SUBCUTANEOUS at 16:12

## 2019-04-16 RX ADMIN — LATANOPROST SCH ML: 50 SOLUTION OPHTHALMIC at 21:40

## 2019-04-16 NOTE — NUR
RECEIVED CARE OF PATIENT. A/OX3. IV SITES C/D/I/P WITH IVF RUNNING PER MD ORDER. PATIENT 
DENIES PAIN, SOB, DIFFICULTY BREATHING. BLE EDEMA NOTED AND LEGS ELEVATED ON PILLOWS. 
PATIENT TOLERATING LOW FLOW 02 NS. SAFETY, SKIN, ASPIRATION PRECAUTIONS IN PLACE AND WILL 
MONITOR. TELE NSR.

## 2019-04-16 NOTE — NUR
DR BACH AT BEDSIDE. MD AWARE PATIENT BLOOD CX SHOWING GNR. PER MD REPEAT BC. AND 
PENDING DR INTERIANO CONSULT TO DETERMINE IF WANT PO ANTICOAGS OR FILTER POSSIBLY.

## 2019-04-16 NOTE — NUR
ALL DUE MEDS GIVEN AND ALL NEEDS MET. PATIENT DENIES SOB, DIFFICULTY BREATHING OR PAIN. IVF 
RUNNING PER MD ORDER. IV SITE C/D/I/P; DRESSING CHANGED. TOLERATING LOW FLOW 02 NC. NO S/S 
BLEEDING. NO NEW COMPLAINTS PER PATIENT. SAFETY, SKIN, ASPIRATION PRECAUTIONS IN PLACE AND 
CALL LIGHT IN REACH.

## 2019-04-16 NOTE — NUR
ALMA RN OPENING NOTES

RECEIVED REPORT FROM GUS BARRIOS. PATIENT A/A/O X3, ABLE TO MAKE NEEDS KNOWN. BREATHING EVEN & 
UNLABORED, TOLERATING O2 @ 4LPM VIA NC. BREATHING TX IN PROGRESS. DENIES SOB OR DIFFICULTY 
BREATHING. RADIAL PULSES PRESENT. RIGHT FOREARM & LEFT FOREARM IV #20  INTACT & PATENT W/ 
DRESSING CDI & IVF NS INFUSING WELL @ 75 ML/HR. DENIES ANY PAIN OR DISCOMFORT @ THIS TIME. 
SAFETY MEASURES IN PLACE W/ SIDE RAILS & BED ALARM ON. INSTRUCTED TO USE CALL LIGHT FOR 
ASSISTANCE. WILL CONTINUE TO MONITOR.

## 2019-04-17 VITALS — DIASTOLIC BLOOD PRESSURE: 66 MMHG | SYSTOLIC BLOOD PRESSURE: 115 MMHG

## 2019-04-17 VITALS — SYSTOLIC BLOOD PRESSURE: 132 MMHG | DIASTOLIC BLOOD PRESSURE: 67 MMHG

## 2019-04-17 VITALS — DIASTOLIC BLOOD PRESSURE: 57 MMHG | SYSTOLIC BLOOD PRESSURE: 110 MMHG

## 2019-04-17 VITALS — DIASTOLIC BLOOD PRESSURE: 74 MMHG | SYSTOLIC BLOOD PRESSURE: 130 MMHG

## 2019-04-17 LAB
ALBUMIN SERPL ELPH-MCNC: 2.5 G/DL (ref 2.9–4.4)
ALBUMIN/GLOB SERPL: 1.1 {RATIO} (ref 0.7–1.7)
ALPHA1 GLOB SERPL ELPH-MCNC: 0.3 G/DL (ref 0–0.4)
ALPHA2 GLOB SERPL ELPH-MCNC: 0.5 G/DL (ref 0.4–1)
B-GLOBULIN SERPL ELPH-MCNC: 0.7 G/DL (ref 0.7–1.3)
BUN SERPL-MCNC: 11 MG/DL (ref 7–18)
CALCIUM SERPL-MCNC: 8.1 MG/DL (ref 8.5–10.1)
CHLORIDE SERPL-SCNC: 110 MMOL/L (ref 98–107)
CO2 SERPL-SCNC: 25 MMOL/L (ref 21–32)
CREAT SERPL-MCNC: 0.7 MG/DL (ref 0.6–1.3)
FERRITIN SERPL-MCNC: 45 NG/ML (ref 8–388)
GAMMA GLOB SERPL ELPH-MCNC: 0.6 G/DL (ref 0.4–1.8)
GLOBULIN SER CALC-MCNC: 2.2 G/DL (ref 2.2–3.9)
GLUCOSE SERPL-MCNC: 98 MG/DL (ref 74–106)
IRON SERPL-MCNC: 20 UG/DL (ref 50–175)
POTASSIUM SERPL-SCNC: 3.9 MMOL/L (ref 3.5–5.1)
PTH-INTACT SERPL-MCNC: 57 PG/ML (ref 15–65)
SODIUM SERPL-SCNC: 143 MMOL/L (ref 136–145)
TIBC SERPL-MCNC: 211 UG/DL (ref 250–450)

## 2019-04-17 RX ADMIN — CEFEPIME HYDROCHLORIDE SCH MLS/HR: 1 INJECTION, POWDER, FOR SOLUTION INTRAMUSCULAR; INTRAVENOUS at 01:17

## 2019-04-17 RX ADMIN — Medication SCH MG: at 20:24

## 2019-04-17 RX ADMIN — FINASTERIDE SCH MG: 5 TABLET, FILM COATED ORAL at 09:13

## 2019-04-17 RX ADMIN — CEFEPIME HYDROCHLORIDE SCH MLS/HR: 1 INJECTION, POWDER, FOR SOLUTION INTRAMUSCULAR; INTRAVENOUS at 12:49

## 2019-04-17 RX ADMIN — METRONIDAZOLE SCH MG: 500 TABLET ORAL at 17:27

## 2019-04-17 RX ADMIN — ATORVASTATIN CALCIUM SCH MG: 40 TABLET, FILM COATED ORAL at 22:10

## 2019-04-17 RX ADMIN — Medication SCH MG: at 07:36

## 2019-04-17 RX ADMIN — LATANOPROST SCH ML: 50 SOLUTION OPHTHALMIC at 22:10

## 2019-04-17 RX ADMIN — ALBUTEROL SULFATE SCH MG: 1.25 SOLUTION RESPIRATORY (INHALATION) at 01:01

## 2019-04-17 RX ADMIN — ENOXAPARIN SODIUM SCH MG: 80 INJECTION SUBCUTANEOUS at 17:28

## 2019-04-17 RX ADMIN — Medication SCH MG: at 01:01

## 2019-04-17 RX ADMIN — ENOXAPARIN SODIUM SCH MG: 80 INJECTION SUBCUTANEOUS at 05:07

## 2019-04-17 RX ADMIN — ALBUTEROL SULFATE SCH MG: 1.25 SOLUTION RESPIRATORY (INHALATION) at 20:24

## 2019-04-17 RX ADMIN — Medication SCH MG: at 12:38

## 2019-04-17 RX ADMIN — ALBUTEROL SULFATE SCH MG: 1.25 SOLUTION RESPIRATORY (INHALATION) at 07:34

## 2019-04-17 RX ADMIN — HYDROXYUREA SCH MG: 500 CAPSULE ORAL at 09:14

## 2019-04-17 RX ADMIN — SODIUM CHLORIDE PRN MLS/HR: 9 INJECTION, SOLUTION INTRAVENOUS at 06:10

## 2019-04-17 RX ADMIN — ALBUTEROL SULFATE SCH MG: 1.25 SOLUTION RESPIRATORY (INHALATION) at 12:38

## 2019-04-17 RX ADMIN — TAMSULOSIN HYDROCHLORIDE SCH MG: 0.4 CAPSULE ORAL at 09:14

## 2019-04-17 NOTE — NUR
MS RN OPENING NOTES



RECEIVED REPORT FROM NIGHT SHIFT NURSE. PATIENT A/A/O X3, ABLE TO MAKE NEEDS KNOWN. 
BREATHING EVEN & UNLABORED, TOLERATING O2 @ 4LPM VIA NC. DENIES SOB OR DIFFICULTY BREATHING. 
RADIAL PULSES PRESENT. RIGHT FOREARM & LEFT FOREARM IV #20  INTACT & PATENT W/ DRESSING CDI 
& IVF NS INFUSING WELL @ 75 ML/HR. DENIES ANY PAIN OR DISCOMFORT @ THIS TIME. SAFETY 
MEASURES IN PLACE W/ SIDE RAILS & BED ALARM ON. INSTRUCTED TO USE CALL LIGHT FOR ASSISTANCE. 
WILL CONTINUE TO MONITOR.

## 2019-04-17 NOTE — NUR
MS RN NOTES



PAGED MD PER PATIENT REQUEST TO SPEAK WITH MD REGARDING HIS HEALTH AND PLANS. MD CALLED BACK 
AND SAID HE WILL SPEAK WITH THE PATIENT IN 1 TO 1.5 HRS. WILL CONT. TO MONITOR PT.

## 2019-04-17 NOTE — NUR
MS RN CLOSING NOTES



PATIENT RESTING IN BED, A/A/O X3, ABLE TO MAKE NEEDS KNOWN. BREATHING EVEN & UNLABORED, 
TOLERATING O2 @ 4LPM VIA NC. DENIES SOB OR DIFFICULTY BREATHING. RADIAL PULSES PRESENT. IV 
SITES PATENT AND FLUSHING WELL, IV SITES CDI. DENIES ANY PAIN OR DISCOMFORT @ THIS TIME. 
SAFETY MEASURES IN PLACE THROUGH OUT SHIFT. INSTRUCTED TO USE CALL LIGHT FOR ASSISTANCE. 
ENDORSED TO NIGHT SHIFT NURSE FOR ADONIS.

## 2019-04-18 VITALS — DIASTOLIC BLOOD PRESSURE: 78 MMHG | SYSTOLIC BLOOD PRESSURE: 153 MMHG

## 2019-04-18 VITALS — DIASTOLIC BLOOD PRESSURE: 72 MMHG | SYSTOLIC BLOOD PRESSURE: 117 MMHG

## 2019-04-18 VITALS — DIASTOLIC BLOOD PRESSURE: 71 MMHG | SYSTOLIC BLOOD PRESSURE: 125 MMHG

## 2019-04-18 VITALS — SYSTOLIC BLOOD PRESSURE: 132 MMHG | DIASTOLIC BLOOD PRESSURE: 67 MMHG

## 2019-04-18 VITALS — SYSTOLIC BLOOD PRESSURE: 116 MMHG | DIASTOLIC BLOOD PRESSURE: 60 MMHG

## 2019-04-18 LAB
ALBUMIN SERPL BCP-MCNC: 2.3 G/DL (ref 3.4–5)
ALP SERPL-CCNC: 68 U/L (ref 46–116)
ALT SERPL W P-5'-P-CCNC: 33 U/L (ref 12–78)
AST SERPL W P-5'-P-CCNC: 30 U/L (ref 15–37)
BASOPHILS # BLD AUTO: 0.1 /CMM (ref 0–0.2)
BASOPHILS NFR BLD AUTO: 1.1 % (ref 0–2)
BILIRUB SERPL-MCNC: 0.4 MG/DL (ref 0.2–1)
BUN SERPL-MCNC: 9 MG/DL (ref 7–18)
CALCIUM SERPL-MCNC: 8.2 MG/DL (ref 8.5–10.1)
CHLORIDE SERPL-SCNC: 113 MMOL/L (ref 98–107)
CO2 SERPL-SCNC: 26 MMOL/L (ref 21–32)
CREAT SERPL-MCNC: 0.7 MG/DL (ref 0.6–1.3)
EOSINOPHIL NFR BLD AUTO: 3.8 % (ref 0–6)
GLUCOSE SERPL-MCNC: 100 MG/DL (ref 74–106)
HCT VFR BLD AUTO: 40 % (ref 39–51)
HGB BLD-MCNC: 13.2 G/DL (ref 13.5–17.5)
LYMPHOCYTES NFR BLD AUTO: 0.7 /CMM (ref 0.8–4.8)
LYMPHOCYTES NFR BLD AUTO: 12.4 % (ref 20–44)
MAGNESIUM SERPL-MCNC: 2 MG/DL (ref 1.8–2.4)
MCHC RBC AUTO-ENTMCNC: 33 G/DL (ref 31–36)
MCV RBC AUTO: 106 FL (ref 80–96)
MONOCYTES NFR BLD AUTO: 0.8 /CMM (ref 0.1–1.3)
MONOCYTES NFR BLD AUTO: 14.8 % (ref 2–12)
NEUTROPHILS # BLD AUTO: 3.7 /CMM (ref 1.8–8.9)
NEUTROPHILS NFR BLD AUTO: 67.9 % (ref 43–81)
PHOSPHATE SERPL-MCNC: 2.8 MG/DL (ref 2.5–4.9)
PLATELET # BLD AUTO: 238 /CMM (ref 150–450)
POTASSIUM SERPL-SCNC: 3.7 MMOL/L (ref 3.5–5.1)
PROT SERPL-MCNC: 5 G/DL (ref 6.4–8.2)
RBC # BLD AUTO: 3.78 MIL/UL (ref 4.5–6)
SODIUM SERPL-SCNC: 147 MMOL/L (ref 136–145)
WBC NRBC COR # BLD AUTO: 5.4 K/UL (ref 4.3–11)

## 2019-04-18 RX ADMIN — METRONIDAZOLE SCH MG: 500 TABLET ORAL at 17:31

## 2019-04-18 RX ADMIN — LATANOPROST SCH ML: 50 SOLUTION OPHTHALMIC at 21:37

## 2019-04-18 RX ADMIN — METRONIDAZOLE SCH MG: 500 TABLET ORAL at 09:00

## 2019-04-18 RX ADMIN — ALBUTEROL SULFATE SCH MG: 1.25 SOLUTION RESPIRATORY (INHALATION) at 12:53

## 2019-04-18 RX ADMIN — METRONIDAZOLE SCH MG: 500 TABLET ORAL at 01:10

## 2019-04-18 RX ADMIN — TAMSULOSIN HYDROCHLORIDE SCH MG: 0.4 CAPSULE ORAL at 09:00

## 2019-04-18 RX ADMIN — Medication SCH MG: at 00:54

## 2019-04-18 RX ADMIN — ENOXAPARIN SODIUM SCH MG: 80 INJECTION SUBCUTANEOUS at 05:49

## 2019-04-18 RX ADMIN — HYDROXYUREA SCH MG: 500 CAPSULE ORAL at 09:00

## 2019-04-18 RX ADMIN — ENOXAPARIN SODIUM SCH MG: 80 INJECTION SUBCUTANEOUS at 17:31

## 2019-04-18 RX ADMIN — ATORVASTATIN CALCIUM SCH MG: 40 TABLET, FILM COATED ORAL at 21:34

## 2019-04-18 RX ADMIN — Medication SCH MG: at 12:53

## 2019-04-18 RX ADMIN — Medication SCH MG: at 19:40

## 2019-04-18 RX ADMIN — Medication SCH MG: at 07:04

## 2019-04-18 RX ADMIN — FINASTERIDE SCH MG: 5 TABLET, FILM COATED ORAL at 09:00

## 2019-04-18 RX ADMIN — ALBUTEROL SULFATE SCH MG: 1.25 SOLUTION RESPIRATORY (INHALATION) at 00:54

## 2019-04-18 RX ADMIN — ALBUTEROL SULFATE SCH MG: 1.25 SOLUTION RESPIRATORY (INHALATION) at 19:40

## 2019-04-18 RX ADMIN — ALBUTEROL SULFATE SCH MG: 1.25 SOLUTION RESPIRATORY (INHALATION) at 07:04

## 2019-04-18 NOTE — NUR
RN NOTES

CALLED [facility] AND SPOKE TO JACKELIN, BLOOD CULTURE RESULTS NOT AVAILABLE AT THIS TIME, NOT 
EVEN PRELIMINARY RESULT

## 2019-04-18 NOTE — NUR
rn notes

notified dr mckeon re culture results not available yet. pt and daughter Yoon updated 
with poc and verbalized understanding

## 2019-04-18 NOTE — NUR
CLOSING NOTES

NO SIGNIFICANT CHANGE NOTED. AFEBRILE AND ALL MEDS GIVEN. NO C/O PAIN MADE. WILL ENDORSE FOR 
CONITNUITY OF CARE IN STABLE CONDITION

## 2019-04-18 NOTE — NUR
MS Notes:

A/Ox4 02 @ 2L N/c S/L x2 right and Left A/C patent and no s/s of infection. Using urinal to 
void. Denies of any distress. Side rails up Call light with reach.

## 2019-04-18 NOTE — NUR
End of Shift:



Lying in be awake 02 @ 2L n/c s/l x2 intact Denies of distress. Side rails up Call light 
within reach. Remains stable.

## 2019-04-18 NOTE — NUR
RN INITIAL NOTES



RECEIVED PATIENT IN BED. PATIENT A/A/O X3, ABLE TO MAKE NEEDS KNOWN. BREATHING EVEN & 
UNLABORED, TOLERATING O2 @ 2L O2 NC. DENIES SOB OR DIFFICULTY BREATHING. RIGHT FOREARM & 
LEFT FOREARM IV #20  INTACT & PATENT W/ DRESSING CDI &  S/L. DENIES ANY PAIN OR DISCOMFORT @ 
THIS TIME. SAFETY MEASURES IN PLACE W/ SIDE RAILS & BED ALARM ON. INSTRUCTED TO USE CALL 
LIGHT FOR ASSISTANCE. WILL CONTINUE TO MONITOR.

## 2019-04-19 VITALS — DIASTOLIC BLOOD PRESSURE: 74 MMHG | SYSTOLIC BLOOD PRESSURE: 146 MMHG

## 2019-04-19 VITALS — SYSTOLIC BLOOD PRESSURE: 109 MMHG | DIASTOLIC BLOOD PRESSURE: 66 MMHG

## 2019-04-19 LAB
BUN SERPL-MCNC: 11 MG/DL (ref 7–18)
CALCIUM SERPL-MCNC: 8.5 MG/DL (ref 8.5–10.1)
CHLORIDE SERPL-SCNC: 111 MMOL/L (ref 98–107)
CO2 SERPL-SCNC: 28 MMOL/L (ref 21–32)
CREAT SERPL-MCNC: 0.8 MG/DL (ref 0.6–1.3)
GLUCOSE SERPL-MCNC: 103 MG/DL (ref 74–106)
POTASSIUM SERPL-SCNC: 4.1 MMOL/L (ref 3.5–5.1)
SODIUM SERPL-SCNC: 145 MMOL/L (ref 136–145)

## 2019-04-19 RX ADMIN — ALBUTEROL SULFATE SCH MG: 1.25 SOLUTION RESPIRATORY (INHALATION) at 14:06

## 2019-04-19 RX ADMIN — ENOXAPARIN SODIUM SCH MG: 80 INJECTION SUBCUTANEOUS at 04:53

## 2019-04-19 RX ADMIN — METRONIDAZOLE SCH MG: 500 TABLET ORAL at 09:50

## 2019-04-19 RX ADMIN — FINASTERIDE SCH MG: 5 TABLET, FILM COATED ORAL at 09:47

## 2019-04-19 RX ADMIN — TAMSULOSIN HYDROCHLORIDE SCH MG: 0.4 CAPSULE ORAL at 09:47

## 2019-04-19 RX ADMIN — Medication SCH MG: at 00:42

## 2019-04-19 RX ADMIN — ALBUTEROL SULFATE SCH MG: 1.25 SOLUTION RESPIRATORY (INHALATION) at 00:42

## 2019-04-19 RX ADMIN — ALBUTEROL SULFATE SCH MG: 1.25 SOLUTION RESPIRATORY (INHALATION) at 07:30

## 2019-04-19 RX ADMIN — Medication SCH MG: at 07:30

## 2019-04-19 RX ADMIN — METRONIDAZOLE SCH MG: 500 TABLET ORAL at 00:08

## 2019-04-19 RX ADMIN — Medication SCH MG: at 14:06

## 2019-04-19 RX ADMIN — HYDROXYUREA SCH MG: 500 CAPSULE ORAL at 09:46

## 2019-04-19 NOTE — NUR
RN CLOSING NOTES

NO SIGNIFICANT CHANGE NOTED. PT IN STABLE CONDITION . NO C/O PAIN MADE. WILL ENDORSE TO AM 
RN FOR CONTINUITY OF CARE.

## 2019-04-19 NOTE — NUR
RN DISCHARGE NOTES



PT STABLE. DENIES SOB OR DIFFICULTY BREATHING. EXIT CARE DONE. DC EDUCATION DONE. DENIES ANY 
PAIN OR DISCOMFORT. SAFETY MEASURES IN PLACE THROUGHOUT SHIFT. ALL MD ORDERS ATTENDED. PT 
LEFT THE UNIT VIA WHEELCHAIR WITH CNA AND DAUGHTER.

## 2020-12-31 NOTE — NUR
Allan Presume is here for Depo Provera 150 mg. Given Intramuscular, Left upper quad. gluteus. Lot Number: [id number]  EXP: 08/2022  Patient tolerated well and is to return to office in 12 weeks. PT PLACED ON MONITOR AND POX. CALL LIGHT PLACED WITHIN REACH. PT'S FAMILY 
BEDSIDE

## 2022-10-29 ENCOUNTER — HOSPITAL ENCOUNTER (INPATIENT)
Dept: HOSPITAL 54 - ER | Age: 87
LOS: 10 days | Discharge: HOSPICE | DRG: 280 | End: 2022-11-08
Attending: NURSE PRACTITIONER | Admitting: NURSE PRACTITIONER
Payer: MEDICARE

## 2022-10-29 VITALS — SYSTOLIC BLOOD PRESSURE: 144 MMHG | DIASTOLIC BLOOD PRESSURE: 99 MMHG

## 2022-10-29 VITALS — SYSTOLIC BLOOD PRESSURE: 110 MMHG | DIASTOLIC BLOOD PRESSURE: 81 MMHG

## 2022-10-29 VITALS — WEIGHT: 161 LBS | BODY MASS INDEX: 20.66 KG/M2 | HEIGHT: 74 IN

## 2022-10-29 VITALS — DIASTOLIC BLOOD PRESSURE: 95 MMHG | SYSTOLIC BLOOD PRESSURE: 140 MMHG

## 2022-10-29 VITALS — SYSTOLIC BLOOD PRESSURE: 131 MMHG | DIASTOLIC BLOOD PRESSURE: 98 MMHG

## 2022-10-29 VITALS — SYSTOLIC BLOOD PRESSURE: 154 MMHG | DIASTOLIC BLOOD PRESSURE: 97 MMHG

## 2022-10-29 VITALS — DIASTOLIC BLOOD PRESSURE: 68 MMHG | SYSTOLIC BLOOD PRESSURE: 114 MMHG

## 2022-10-29 VITALS — DIASTOLIC BLOOD PRESSURE: 91 MMHG | SYSTOLIC BLOOD PRESSURE: 148 MMHG

## 2022-10-29 VITALS — SYSTOLIC BLOOD PRESSURE: 152 MMHG | DIASTOLIC BLOOD PRESSURE: 108 MMHG

## 2022-10-29 VITALS — DIASTOLIC BLOOD PRESSURE: 95 MMHG | SYSTOLIC BLOOD PRESSURE: 152 MMHG

## 2022-10-29 VITALS — SYSTOLIC BLOOD PRESSURE: 159 MMHG | DIASTOLIC BLOOD PRESSURE: 105 MMHG

## 2022-10-29 DIAGNOSIS — I21.4: ICD-10-CM

## 2022-10-29 DIAGNOSIS — N40.0: ICD-10-CM

## 2022-10-29 DIAGNOSIS — E44.1: ICD-10-CM

## 2022-10-29 DIAGNOSIS — Z20.822: ICD-10-CM

## 2022-10-29 DIAGNOSIS — D32.9: ICD-10-CM

## 2022-10-29 DIAGNOSIS — Z86.711: ICD-10-CM

## 2022-10-29 DIAGNOSIS — Z86.718: ICD-10-CM

## 2022-10-29 DIAGNOSIS — Z66: ICD-10-CM

## 2022-10-29 DIAGNOSIS — Z79.899: ICD-10-CM

## 2022-10-29 DIAGNOSIS — E88.09: ICD-10-CM

## 2022-10-29 DIAGNOSIS — K80.20: ICD-10-CM

## 2022-10-29 DIAGNOSIS — Z87.891: ICD-10-CM

## 2022-10-29 DIAGNOSIS — G92.8: ICD-10-CM

## 2022-10-29 DIAGNOSIS — G93.89: ICD-10-CM

## 2022-10-29 DIAGNOSIS — Z85.51: ICD-10-CM

## 2022-10-29 DIAGNOSIS — I26.99: ICD-10-CM

## 2022-10-29 DIAGNOSIS — F03.90: ICD-10-CM

## 2022-10-29 DIAGNOSIS — J44.1: ICD-10-CM

## 2022-10-29 DIAGNOSIS — J98.11: ICD-10-CM

## 2022-10-29 DIAGNOSIS — E87.0: ICD-10-CM

## 2022-10-29 DIAGNOSIS — I50.23: ICD-10-CM

## 2022-10-29 DIAGNOSIS — Z79.01: ICD-10-CM

## 2022-10-29 DIAGNOSIS — J96.21: ICD-10-CM

## 2022-10-29 DIAGNOSIS — I25.10: ICD-10-CM

## 2022-10-29 DIAGNOSIS — I11.0: Primary | ICD-10-CM

## 2022-10-29 DIAGNOSIS — Z92.21: ICD-10-CM

## 2022-10-29 DIAGNOSIS — D45: ICD-10-CM

## 2022-10-29 DIAGNOSIS — I63.9: ICD-10-CM

## 2022-10-29 DIAGNOSIS — E78.5: ICD-10-CM

## 2022-10-29 DIAGNOSIS — D47.1: ICD-10-CM

## 2022-10-29 LAB
ALBUMIN SERPL BCP-MCNC: 3.1 G/DL (ref 3.4–5)
ALP SERPL-CCNC: 90 U/L (ref 46–116)
ALT SERPL W P-5'-P-CCNC: 24 U/L (ref 12–78)
AST SERPL W P-5'-P-CCNC: 34 U/L (ref 15–37)
BASE EXCESS BLDA CALC-SCNC: -2.6 MMOL/L
BASOPHILS # BLD AUTO: 0.1 K/UL (ref 0–0.2)
BASOPHILS NFR BLD AUTO: 0.5 % (ref 0–2)
BILIRUB DIRECT SERPL-MCNC: 0.6 MG/DL (ref 0–0.2)
BILIRUB SERPL-MCNC: 1.5 MG/DL (ref 0.2–1)
BUN SERPL-MCNC: 14 MG/DL (ref 7–18)
CALCIUM SERPL-MCNC: 9.1 MG/DL (ref 8.5–10.1)
CHLORIDE SERPL-SCNC: 107 MMOL/L (ref 98–107)
CO2 SERPL-SCNC: 26 MMOL/L (ref 21–32)
CREAT SERPL-MCNC: 1.2 MG/DL (ref 0.6–1.3)
EOSINOPHIL NFR BLD AUTO: 3.5 % (ref 0–6)
EOSINOPHIL NFR BLD MANUAL: 3 % (ref 0–4)
GLUCOSE SERPL-MCNC: 136 MG/DL (ref 74–106)
HCT VFR BLD AUTO: 60 % (ref 39–51)
HGB BLD-MCNC: 19.1 G/DL (ref 13.5–17.5)
INHALED O2 CONCENTRATION: 80 %
LYMPHOCYTES NFR BLD AUTO: 15.2 % (ref 20–44)
LYMPHOCYTES NFR BLD AUTO: 2 K/UL (ref 0.8–4.8)
LYMPHOCYTES NFR BLD MANUAL: 16 % (ref 16–48)
MCHC RBC AUTO-ENTMCNC: 32 G/DL (ref 31–36)
MCV RBC AUTO: 87 FL (ref 80–96)
MONOCYTES NFR BLD AUTO: 1.1 K/UL (ref 0.1–1.3)
MONOCYTES NFR BLD AUTO: 8.4 % (ref 2–12)
MONOCYTES NFR BLD MANUAL: 10 % (ref 0–11)
NEUTROPHILS # BLD AUTO: 9.3 K/UL (ref 1.8–8.9)
NEUTROPHILS NFR BLD AUTO: 72.4 % (ref 43–81)
NEUTS SEG NFR BLD MANUAL: 71 % (ref 42–76)
PCO2 TEMP ADJ BLDA: 35.7 MMHG (ref 35–45)
PH TEMP ADJ BLDA: 7.39 [PH] (ref 7.35–7.45)
PLATELET # BLD AUTO: 271 K/UL (ref 150–450)
PO2 TEMP ADJ BLDA: 81 MMHG (ref 75–100)
POTASSIUM SERPL-SCNC: 4.1 MMOL/L (ref 3.5–5.1)
PROT SERPL-MCNC: 6.6 G/DL (ref 6.4–8.2)
RBC # BLD AUTO: 6.91 MIL/UL (ref 4.5–6)
SODIUM SERPL-SCNC: 143 MMOL/L (ref 136–145)
VENTILATION MODE VENT: (no result)
WBC NRBC COR # BLD AUTO: 12.9 K/UL (ref 4.3–11)

## 2022-10-29 PROCEDURE — C9113 INJ PANTOPRAZOLE SODIUM, VIA: HCPCS

## 2022-10-29 PROCEDURE — A6253 ABSORPT DRG > 48 SQ IN W/O B: HCPCS

## 2022-10-29 PROCEDURE — G0378 HOSPITAL OBSERVATION PER HR: HCPCS

## 2022-10-29 RX ADMIN — ENOXAPARIN SODIUM SCH MG: 80 INJECTION SUBCUTANEOUS at 18:48

## 2022-10-29 RX ADMIN — LEVALBUTEROL HYDROCHLORIDE SCH MG: 1.25 SOLUTION, CONCENTRATE RESPIRATORY (INHALATION) at 19:51

## 2022-10-29 RX ADMIN — SODIUM CHLORIDE PRN MLS/HR: 9 INJECTION, SOLUTION INTRAVENOUS at 17:05

## 2022-10-29 RX ADMIN — FUROSEMIDE SCH MG: 10 INJECTION, SOLUTION INTRAMUSCULAR; INTRAVENOUS at 15:36

## 2022-10-29 RX ADMIN — DEXTROSE MONOHYDRATE SCH MLS/HR: 50 INJECTION, SOLUTION INTRAVENOUS at 17:05

## 2022-10-29 RX ADMIN — Medication SCH MG: at 19:51

## 2022-10-29 RX ADMIN — LATANOPROST SCH ML: 50 SOLUTION OPHTHALMIC at 21:04

## 2022-10-29 RX ADMIN — ATORVASTATIN CALCIUM SCH MG: 10 TABLET, FILM COATED ORAL at 21:04

## 2022-10-29 NOTE — NUR
-------------------------------------------------------------------------------

           *** Note undone in [facility] - 10/29/22 at 1141 by CARINA ***           

-------------------------------------------------------------------------------

X-RAY TECH AT BEDSIDE

## 2022-10-29 NOTE — NUR
RN NOTES





RECEIVED REPORT FROM MORNING RN. PATIENT IN BED A/O X2-3 WITH PERIODS OF CONFUSION. WITH IV 
ACCESS AT L AC #20, R AC #20 PATENT FLUSHES WELL. ON NON REBREATHER MASK 15LPM TOLERATING 
WELL SATING 93%.  BILATERAL LOWER LEG EDEMA +4. ALL SAFETY MEASURES IN PLACE AT ALL TIMES. 
HOB ELVATED. CALL LIGHT WITHIN REACH. WILL CLOSELY MONITOR THE PATIENT

## 2022-10-29 NOTE — NUR
ICU/RN



PT RECEIVED IN BED ON 10L O2 NRB, INCREASED TO 15L DUE TO STRUGGLED BREATHING FROM PT, SAT 
BETWEEN 88% AND 92%. LEFT AC AND RIGHT AC 20G IN PLACE, FLUSHING WELL. BILATERAL LOWER 
EXTREMITIES EDEMA PITTING +4. DNP PINO ASSESSED AND TALKED TO PT AT BEDSIDE. PT SR/STACH ON 
BEDSIDE MONITOR, BP STABLE. BED LOCKED AND IN LOWEST POSITION, CALL LIGHT WITHIN REACH, 3 
SIDE RAILS UP.

## 2022-10-29 NOTE — NUR
BIBRA60 FROM HOME C/O SOB SINCE THIS AM, HYPERTENSIVE IN FIELD, NITRO X3 AND 
ALBUTEROL GIVEN PTA. +BLE EDEMA NOTED. PLACED ON BED, AAOX4, DYSPNEIC RR-24, 
ATTACHED TO MONITOR SATS-80%RA, KNOWN HX OF COPD, RESPIRATORY TECH AT BEDSIDE 
GIVING BREATHING TREATMENT SATURATING AT 90%.

## 2022-10-29 NOTE — NUR
PATIENT STILL DESATURATES TO 80% DR LAW BROWN ORDERS BIPAP- RESPIRATORY TECH AT 
BEDSIDE AND ATTACHED TO BIPAP WITH THE SETTING IPAP-15, EPAP-5, O2-80% 
SATURATING AT 95%.

## 2022-10-29 NOTE — NUR
ICU/RN



PT REMAINS IN BED, RESTING. PT ON 15L O2 NRB SAT 94% ON BEDSIDE MONITOR. SINUS RHYTHM ON 
MONITOR AT THIS TIME. LEFT AC 20G AND RIGHT AC 20G IN PLACE. BED LOCKED AND IN LOWEST 
POSITION, CALL LIGHT WITHIN REACH, 3 SIDE RAILS UP.

## 2022-10-30 VITALS — DIASTOLIC BLOOD PRESSURE: 72 MMHG | SYSTOLIC BLOOD PRESSURE: 128 MMHG

## 2022-10-30 VITALS — DIASTOLIC BLOOD PRESSURE: 95 MMHG | SYSTOLIC BLOOD PRESSURE: 156 MMHG

## 2022-10-30 VITALS — SYSTOLIC BLOOD PRESSURE: 116 MMHG | DIASTOLIC BLOOD PRESSURE: 74 MMHG

## 2022-10-30 VITALS — DIASTOLIC BLOOD PRESSURE: 97 MMHG | SYSTOLIC BLOOD PRESSURE: 153 MMHG

## 2022-10-30 VITALS — DIASTOLIC BLOOD PRESSURE: 120 MMHG | SYSTOLIC BLOOD PRESSURE: 166 MMHG

## 2022-10-30 VITALS — DIASTOLIC BLOOD PRESSURE: 115 MMHG | SYSTOLIC BLOOD PRESSURE: 159 MMHG

## 2022-10-30 VITALS — DIASTOLIC BLOOD PRESSURE: 77 MMHG | SYSTOLIC BLOOD PRESSURE: 119 MMHG

## 2022-10-30 VITALS — SYSTOLIC BLOOD PRESSURE: 169 MMHG | DIASTOLIC BLOOD PRESSURE: 105 MMHG

## 2022-10-30 VITALS — DIASTOLIC BLOOD PRESSURE: 114 MMHG | SYSTOLIC BLOOD PRESSURE: 164 MMHG

## 2022-10-30 VITALS — SYSTOLIC BLOOD PRESSURE: 165 MMHG | DIASTOLIC BLOOD PRESSURE: 104 MMHG

## 2022-10-30 VITALS — SYSTOLIC BLOOD PRESSURE: 160 MMHG | DIASTOLIC BLOOD PRESSURE: 108 MMHG

## 2022-10-30 VITALS — DIASTOLIC BLOOD PRESSURE: 110 MMHG | SYSTOLIC BLOOD PRESSURE: 152 MMHG

## 2022-10-30 VITALS — SYSTOLIC BLOOD PRESSURE: 132 MMHG | DIASTOLIC BLOOD PRESSURE: 78 MMHG

## 2022-10-30 VITALS — DIASTOLIC BLOOD PRESSURE: 69 MMHG | SYSTOLIC BLOOD PRESSURE: 128 MMHG

## 2022-10-30 VITALS — SYSTOLIC BLOOD PRESSURE: 150 MMHG | DIASTOLIC BLOOD PRESSURE: 98 MMHG

## 2022-10-30 VITALS — SYSTOLIC BLOOD PRESSURE: 133 MMHG | DIASTOLIC BLOOD PRESSURE: 91 MMHG

## 2022-10-30 VITALS — SYSTOLIC BLOOD PRESSURE: 121 MMHG | DIASTOLIC BLOOD PRESSURE: 71 MMHG

## 2022-10-30 VITALS — DIASTOLIC BLOOD PRESSURE: 95 MMHG | SYSTOLIC BLOOD PRESSURE: 138 MMHG

## 2022-10-30 VITALS — SYSTOLIC BLOOD PRESSURE: 150 MMHG | DIASTOLIC BLOOD PRESSURE: 99 MMHG

## 2022-10-30 VITALS — SYSTOLIC BLOOD PRESSURE: 160 MMHG | DIASTOLIC BLOOD PRESSURE: 107 MMHG

## 2022-10-30 VITALS — SYSTOLIC BLOOD PRESSURE: 112 MMHG | DIASTOLIC BLOOD PRESSURE: 27 MMHG

## 2022-10-30 VITALS — SYSTOLIC BLOOD PRESSURE: 165 MMHG | DIASTOLIC BLOOD PRESSURE: 84 MMHG

## 2022-10-30 VITALS — SYSTOLIC BLOOD PRESSURE: 127 MMHG | DIASTOLIC BLOOD PRESSURE: 69 MMHG

## 2022-10-30 LAB
BASE EXCESS BLDA CALC-SCNC: 1.9 MMOL/L
BASOPHILS # BLD AUTO: 0 K/UL (ref 0–0.2)
BASOPHILS NFR BLD AUTO: 0.2 % (ref 0–2)
BUN SERPL-MCNC: 18 MG/DL (ref 7–18)
CALCIUM SERPL-MCNC: 9.6 MG/DL (ref 8.5–10.1)
CHLORIDE SERPL-SCNC: 106 MMOL/L (ref 98–107)
CHOLEST SERPL-MCNC: 183 MG/DL (ref ?–200)
CO2 SERPL-SCNC: 21 MMOL/L (ref 21–32)
CREAT SERPL-MCNC: 1.3 MG/DL (ref 0.6–1.3)
DO-HGB MFR BLDA: 226.7 MMHG
EOSINOPHIL NFR BLD AUTO: 0.1 % (ref 0–6)
EOSINOPHIL NFR BLD MANUAL: 1 % (ref 0–4)
FERRITIN SERPL-MCNC: 30 NG/ML (ref 8–388)
GLUCOSE SERPL-MCNC: 169 MG/DL (ref 74–106)
HCT VFR BLD AUTO: 63 % (ref 39–51)
HDLC SERPL-MCNC: 58 MG/DL (ref 40–60)
HGB BLD-MCNC: 20.1 G/DL (ref 13.5–17.5)
INHALED O2 CONCENTRATION: 44 %
INHALED O2 FLOW RATE: 6 L/MIN (ref 0–30)
IRON SERPL-MCNC: 16 UG/DL (ref 50–175)
LDLC SERPL DIRECT ASSAY-MCNC: 116 MG/DL (ref 0–99)
LYMPHOCYTES NFR BLD AUTO: 0.5 K/UL (ref 0.8–4.8)
LYMPHOCYTES NFR BLD AUTO: 2.3 % (ref 20–44)
LYMPHOCYTES NFR BLD MANUAL: 2 % (ref 16–48)
MAGNESIUM SERPL-MCNC: 2.4 MG/DL (ref 1.8–2.4)
MCHC RBC AUTO-ENTMCNC: 32 G/DL (ref 31–36)
MCV RBC AUTO: 87 FL (ref 80–96)
MONOCYTES NFR BLD AUTO: 0.5 K/UL (ref 0.1–1.3)
MONOCYTES NFR BLD AUTO: 2.4 % (ref 2–12)
MONOCYTES NFR BLD MANUAL: 2 % (ref 0–11)
NEUTROPHILS # BLD AUTO: 18.4 K/UL (ref 1.8–8.9)
NEUTROPHILS NFR BLD AUTO: 95 % (ref 43–81)
NEUTS SEG NFR BLD MANUAL: 95 % (ref 42–76)
PCO2 TEMP ADJ BLDA: 29.6 MMHG (ref 35–45)
PH TEMP ADJ BLDA: 7.51 [PH] (ref 7.35–7.45)
PHOSPHATE SERPL-MCNC: 4.3 MG/DL (ref 2.5–4.9)
PLATELET # BLD AUTO: 275 K/UL (ref 150–450)
PO2 TEMP ADJ BLDA: 53.3 MMHG (ref 75–100)
POTASSIUM SERPL-SCNC: 4.5 MMOL/L (ref 3.5–5.1)
RBC # BLD AUTO: 7.2 MIL/UL (ref 4.5–6)
SAO2 % BLDA: 89.5 % (ref 92–98.5)
SODIUM SERPL-SCNC: 145 MMOL/L (ref 136–145)
TIBC SERPL-MCNC: 312 UG/DL (ref 250–450)
TRIGL SERPL-MCNC: 67 MG/DL (ref 30–150)
TSH SERPL DL<=0.005 MIU/L-ACNC: 1.11 UIU/ML (ref 0.36–3.74)
URATE SERPL-MCNC: 7.8 MG/DL (ref 2.6–7.2)
VENTILATION MODE VENT: (no result)
WBC NRBC COR # BLD AUTO: 19.4 K/UL (ref 4.3–11)

## 2022-10-30 PROCEDURE — 05HB33Z INSERTION OF INFUSION DEVICE INTO RIGHT BASILIC VEIN, PERCUTANEOUS APPROACH: ICD-10-PCS | Performed by: NURSE PRACTITIONER

## 2022-10-30 PROCEDURE — 05HC33Z INSERTION OF INFUSION DEVICE INTO LEFT BASILIC VEIN, PERCUTANEOUS APPROACH: ICD-10-PCS | Performed by: NURSE PRACTITIONER

## 2022-10-30 RX ADMIN — ATORVASTATIN CALCIUM SCH MG: 10 TABLET, FILM COATED ORAL at 21:33

## 2022-10-30 RX ADMIN — ENOXAPARIN SODIUM SCH MG: 80 INJECTION SUBCUTANEOUS at 06:50

## 2022-10-30 RX ADMIN — DEXTROSE MONOHYDRATE SCH MLS/HR: 50 INJECTION, SOLUTION INTRAVENOUS at 04:34

## 2022-10-30 RX ADMIN — LATANOPROST SCH DROP: 50 SOLUTION OPHTHALMIC at 21:32

## 2022-10-30 RX ADMIN — FUROSEMIDE SCH MG: 10 INJECTION, SOLUTION INTRAMUSCULAR; INTRAVENOUS at 13:15

## 2022-10-30 RX ADMIN — LEVALBUTEROL HYDROCHLORIDE SCH MG: 1.25 SOLUTION, CONCENTRATE RESPIRATORY (INHALATION) at 13:45

## 2022-10-30 RX ADMIN — FUROSEMIDE SCH MG: 10 INJECTION, SOLUTION INTRAMUSCULAR; INTRAVENOUS at 08:03

## 2022-10-30 RX ADMIN — DEXTROSE MONOHYDRATE SCH MLS/HR: 50 INJECTION, SOLUTION INTRAVENOUS at 16:05

## 2022-10-30 RX ADMIN — Medication SCH MG: at 20:00

## 2022-10-30 RX ADMIN — Medication SCH MG: at 01:56

## 2022-10-30 RX ADMIN — FINASTERIDE SCH MG: 5 TABLET, FILM COATED ORAL at 08:03

## 2022-10-30 RX ADMIN — LEVALBUTEROL HYDROCHLORIDE SCH MG: 1.25 SOLUTION, CONCENTRATE RESPIRATORY (INHALATION) at 20:00

## 2022-10-30 RX ADMIN — NITROGLYCERIN SCH GM: 20 OINTMENT TOPICAL at 21:41

## 2022-10-30 RX ADMIN — Medication SCH MG: at 13:44

## 2022-10-30 RX ADMIN — FUROSEMIDE SCH MG: 10 INJECTION, SOLUTION INTRAMUSCULAR; INTRAVENOUS at 18:00

## 2022-10-30 RX ADMIN — PANTOPRAZOLE SODIUM SCH MG: 40 TABLET, DELAYED RELEASE ORAL at 08:03

## 2022-10-30 RX ADMIN — ENOXAPARIN SODIUM SCH MG: 80 INJECTION SUBCUTANEOUS at 19:49

## 2022-10-30 RX ADMIN — FUROSEMIDE SCH MG: 10 INJECTION, SOLUTION INTRAMUSCULAR; INTRAVENOUS at 10:11

## 2022-10-30 RX ADMIN — HYDROXYUREA SCH MG: 500 CAPSULE ORAL at 17:30

## 2022-10-30 RX ADMIN — NITROGLYCERIN SCH GM: 20 OINTMENT TOPICAL at 11:32

## 2022-10-30 RX ADMIN — LEVALBUTEROL HYDROCHLORIDE SCH MG: 1.25 SOLUTION, CONCENTRATE RESPIRATORY (INHALATION) at 01:56

## 2022-10-30 RX ADMIN — Medication SCH MG: at 07:35

## 2022-10-30 RX ADMIN — QUETIAPINE PRN MG: 25 TABLET, FILM COATED ORAL at 17:33

## 2022-10-30 RX ADMIN — TAMSULOSIN HYDROCHLORIDE SCH MG: 0.4 CAPSULE ORAL at 08:03

## 2022-10-30 RX ADMIN — LEVALBUTEROL HYDROCHLORIDE SCH MG: 1.25 SOLUTION, CONCENTRATE RESPIRATORY (INHALATION) at 07:35

## 2022-10-30 NOTE — NUR
RN NOTES





IV LINE INFILTRATED PATIENT COMPLAINS OF SITE ON IV SITE. MULTIPLE ATTEMPT TO INSERT 
PERIPHERAL LINE WITH NO SUCCESS. CN MADE AWARE. WILL INSERT MIDLINE IN AM.

## 2022-10-30 NOTE — NUR
RN NOTES





PATIENT REMAINS STABLE NO SIGNIFICANT CHANGES. ALL DUE MEDS GIVEN AS ORDERED. STILL ON NON 
REBREATHER AT 15LPM SATING 98% NO SOB NOTED DURING THE SHIFT. ALL SAFETY MEASURES IN PLACE 
AT ALL TIMES. HOB ELEVATED. CALL LIGHT WITHIN REACG. FREQUENT VISUAL MONITORING RENDERED 
WILL ENDORSE TO MORNING SHIFT FOR ADONIS

## 2022-10-30 NOTE — NUR
ICU RN OPENING NOTE

PT RECEIVED IN BED, AWAKE, A&O X2, CONFUSED. PT ON NON-REBREATHER MASK AT 15 LPM WITH 
CURRENT O2SAT OF 93%. PT REFUSES TO BE ATTACHED TO EXTERNAL MONITOR, HR NOTED TO BE ELEVATED 
. MARQUITA MIDLINE INTACT AND PATENT, FLUSHES EASILY WITH NO RESISTANCE; NO MEDS/FLUIDS 
INFUSING THROUGH IT. BED IN LOWEST POSITION, CALL LIGHT WITHIN REACH, SIDE RAILS UP X3. WILL 
CONTINUE TO MONITOR THROUGHOUT THE NIGHT.

## 2022-10-30 NOTE — NUR
ICU/RN



LEFT AC IV LINE REMOVED BY NIGHT SHIFT, PT COMPLAINING OF PAIN WHEN FLUSHING. RIGHT AC IV 
LINE REMOVED THIS AM, REDNESS AND MINOR SWELLING AROUND THE SITE NOTED. MIDLINE NURSE AT 
BEDSIDE INSERTED MIDLINE.

## 2022-10-30 NOTE — NUR
ICU/RN



PT INCREASINGLY AGITATED AND CONFUSED. PT REFUSING TO STAY IN BED. PT REMOVED BP CUFF, HEART 
MONITOR AND SPO2 MONITOR. PT REMOVED NASAL CANNULA. DNP PINO AWARE. PT ACCEPTED TO TAKE HIS 
SEROQUEL, REFUSING ALL OTHER MEDICATION AT THIS TIME. TALKED TO DAUGHTER ON THE PHONE, 
DAUGHTER STATES SHE IS ON HER WAY TO SEE PT.

## 2022-10-30 NOTE — NUR
ICU/RN



PT PLACED BACK ON 15L NRB BY RTN DUE TO ABG RESULTS. RN NOTICED TWICHING OF PT'S RIGHT LEG. 
STROKE ASSESSMENT PERFORMED, PT DOES NOT DEMONSTRATE FACIAL DROPPING, NO DIFFICULTY 
SPEAKING, EQUAL STRENGTH BILATERAL LOWER AND UPPER EXTREMITIES. DNP PINO NOTIFIED.

## 2022-10-30 NOTE — NUR
ICU/RN



PT'S SON AND DAUGHTER AT BEDSIDE, PT AGREEING TO HAVE 6L O2 NC ON AT THIS TIME AND OXYGEN 
MONITOR, STILL REFUSES BP AND HEART MONITOR.

## 2022-10-30 NOTE — NUR
RN NOTES





RELAYED TO DR POOLE /76 WITH NEW ORDER FOR HYDRALAZINE 10 MG IV Q6H PRN FOR SBP MORE 
THAN 180.

## 2022-10-30 NOTE — NUR
ICU/RN



PT STILL REFUSING TO USE OXYGEN THROUGH NASAL CANNULA AND TO BE ATTACHED TO MONITOR. PT 
CONTINUES TO REFUSE MEDICATION.

## 2022-10-31 VITALS — DIASTOLIC BLOOD PRESSURE: 91 MMHG | SYSTOLIC BLOOD PRESSURE: 136 MMHG

## 2022-10-31 VITALS — SYSTOLIC BLOOD PRESSURE: 119 MMHG | DIASTOLIC BLOOD PRESSURE: 72 MMHG

## 2022-10-31 VITALS — SYSTOLIC BLOOD PRESSURE: 130 MMHG | DIASTOLIC BLOOD PRESSURE: 87 MMHG

## 2022-10-31 VITALS — DIASTOLIC BLOOD PRESSURE: 65 MMHG | SYSTOLIC BLOOD PRESSURE: 105 MMHG

## 2022-10-31 VITALS — SYSTOLIC BLOOD PRESSURE: 127 MMHG | DIASTOLIC BLOOD PRESSURE: 67 MMHG

## 2022-10-31 VITALS — SYSTOLIC BLOOD PRESSURE: 124 MMHG | DIASTOLIC BLOOD PRESSURE: 73 MMHG

## 2022-10-31 VITALS — SYSTOLIC BLOOD PRESSURE: 136 MMHG | DIASTOLIC BLOOD PRESSURE: 91 MMHG

## 2022-10-31 VITALS — DIASTOLIC BLOOD PRESSURE: 76 MMHG | SYSTOLIC BLOOD PRESSURE: 135 MMHG

## 2022-10-31 VITALS — SYSTOLIC BLOOD PRESSURE: 105 MMHG | DIASTOLIC BLOOD PRESSURE: 86 MMHG

## 2022-10-31 VITALS — SYSTOLIC BLOOD PRESSURE: 147 MMHG | DIASTOLIC BLOOD PRESSURE: 86 MMHG

## 2022-10-31 VITALS — SYSTOLIC BLOOD PRESSURE: 131 MMHG | DIASTOLIC BLOOD PRESSURE: 72 MMHG

## 2022-10-31 VITALS — DIASTOLIC BLOOD PRESSURE: 75 MMHG | SYSTOLIC BLOOD PRESSURE: 145 MMHG

## 2022-10-31 VITALS — SYSTOLIC BLOOD PRESSURE: 127 MMHG | DIASTOLIC BLOOD PRESSURE: 76 MMHG

## 2022-10-31 VITALS — SYSTOLIC BLOOD PRESSURE: 139 MMHG | DIASTOLIC BLOOD PRESSURE: 80 MMHG

## 2022-10-31 VITALS — DIASTOLIC BLOOD PRESSURE: 78 MMHG | SYSTOLIC BLOOD PRESSURE: 130 MMHG

## 2022-10-31 VITALS — DIASTOLIC BLOOD PRESSURE: 94 MMHG | SYSTOLIC BLOOD PRESSURE: 135 MMHG

## 2022-10-31 VITALS — DIASTOLIC BLOOD PRESSURE: 66 MMHG | SYSTOLIC BLOOD PRESSURE: 135 MMHG

## 2022-10-31 VITALS — SYSTOLIC BLOOD PRESSURE: 135 MMHG | DIASTOLIC BLOOD PRESSURE: 90 MMHG

## 2022-10-31 VITALS — DIASTOLIC BLOOD PRESSURE: 76 MMHG | SYSTOLIC BLOOD PRESSURE: 127 MMHG

## 2022-10-31 LAB
ALBUMIN SERPL BCP-MCNC: 3.1 G/DL (ref 3.4–5)
ALP SERPL-CCNC: 76 U/L (ref 46–116)
ALT SERPL W P-5'-P-CCNC: 37 U/L (ref 12–78)
AST SERPL W P-5'-P-CCNC: 67 U/L (ref 15–37)
BASE EXCESS BLDA CALC-SCNC: 4.9 MMOL/L
BASOPHILS # BLD AUTO: 0.1 K/UL (ref 0–0.2)
BASOPHILS NFR BLD AUTO: 0.3 % (ref 0–2)
BASOPHILS NFR BLD MANUAL: 0 % (ref 0–2)
BILIRUB SERPL-MCNC: 0.8 MG/DL (ref 0.2–1)
BUN SERPL-MCNC: 24 MG/DL (ref 7–18)
CALCIUM SERPL-MCNC: 9.4 MG/DL (ref 8.5–10.1)
CHLORIDE SERPL-SCNC: 109 MMOL/L (ref 98–107)
CO2 SERPL-SCNC: 28 MMOL/L (ref 21–32)
CREAT SERPL-MCNC: 1.4 MG/DL (ref 0.6–1.3)
DO-HGB MFR BLDA: 263.5 MMHG
EOSINOPHIL NFR BLD AUTO: 0 % (ref 0–6)
EOSINOPHIL NFR BLD MANUAL: 3 % (ref 0–4)
GLUCOSE SERPL-MCNC: 141 MG/DL (ref 74–106)
HCT VFR BLD AUTO: 56 % (ref 39–51)
HGB BLD-MCNC: 17.7 G/DL (ref 13.5–17.5)
INHALED O2 CONCENTRATION: 52 %
LYMPHOCYTES NFR BLD AUTO: 0.3 K/UL (ref 0.8–4.8)
LYMPHOCYTES NFR BLD AUTO: 1.1 % (ref 20–44)
LYMPHOCYTES NFR BLD MANUAL: 4 % (ref 16–48)
MAGNESIUM SERPL-MCNC: 2.2 MG/DL (ref 1.8–2.4)
MCHC RBC AUTO-ENTMCNC: 32 G/DL (ref 31–36)
MCV RBC AUTO: 86 FL (ref 80–96)
MONOCYTES NFR BLD AUTO: 1.3 K/UL (ref 0.1–1.3)
MONOCYTES NFR BLD AUTO: 5.7 % (ref 2–12)
MONOCYTES NFR BLD MANUAL: 9 % (ref 0–11)
NEUTROPHILS # BLD AUTO: 21.7 K/UL (ref 1.8–8.9)
NEUTROPHILS NFR BLD AUTO: 92.9 % (ref 43–81)
NEUTS SEG NFR BLD MANUAL: 84 % (ref 42–76)
PCO2 TEMP ADJ BLDA: 39.2 MMHG (ref 35–45)
PH TEMP ADJ BLDA: 7.48 [PH] (ref 7.35–7.45)
PHOSPHATE SERPL-MCNC: 4 MG/DL (ref 2.5–4.9)
PLATELET # BLD AUTO: 297 K/UL (ref 150–450)
PO2 TEMP ADJ BLDA: 63.4 MMHG (ref 75–100)
POTASSIUM SERPL-SCNC: 3.8 MMOL/L (ref 3.5–5.1)
PROT SERPL-MCNC: 6 G/DL (ref 6.4–8.2)
RBC # BLD AUTO: 6.5 MIL/UL (ref 4.5–6)
SAO2 % BLDA: 92.1 % (ref 92–98.5)
SODIUM SERPL-SCNC: 146 MMOL/L (ref 136–145)
URATE SERPL-MCNC: 8.1 MG/DL (ref 2.6–7.2)
WBC NRBC COR # BLD AUTO: 23.4 K/UL (ref 4.3–11)

## 2022-10-31 RX ADMIN — ATORVASTATIN CALCIUM SCH MG: 10 TABLET, FILM COATED ORAL at 21:03

## 2022-10-31 RX ADMIN — ENOXAPARIN SODIUM SCH MG: 80 INJECTION SUBCUTANEOUS at 18:09

## 2022-10-31 RX ADMIN — LATANOPROST SCH DROP: 50 SOLUTION OPHTHALMIC at 21:03

## 2022-10-31 RX ADMIN — QUETIAPINE PRN MG: 25 TABLET, FILM COATED ORAL at 09:38

## 2022-10-31 RX ADMIN — LEVALBUTEROL HYDROCHLORIDE SCH MG: 1.25 SOLUTION, CONCENTRATE RESPIRATORY (INHALATION) at 07:45

## 2022-10-31 RX ADMIN — HYDROXYUREA SCH MG: 500 CAPSULE ORAL at 16:11

## 2022-10-31 RX ADMIN — HYDROXYUREA SCH MG: 500 CAPSULE ORAL at 08:39

## 2022-10-31 RX ADMIN — NITROGLYCERIN SCH GM: 20 OINTMENT TOPICAL at 20:57

## 2022-10-31 RX ADMIN — Medication SCH MG: at 20:24

## 2022-10-31 RX ADMIN — LEVALBUTEROL HYDROCHLORIDE SCH MG: 1.25 SOLUTION, CONCENTRATE RESPIRATORY (INHALATION) at 01:39

## 2022-10-31 RX ADMIN — DEXTROSE MONOHYDRATE SCH MLS/HR: 50 INJECTION, SOLUTION INTRAVENOUS at 07:51

## 2022-10-31 RX ADMIN — FINASTERIDE SCH MG: 5 TABLET, FILM COATED ORAL at 08:38

## 2022-10-31 RX ADMIN — DEXTROSE MONOHYDRATE SCH MLS/HR: 50 INJECTION, SOLUTION INTRAVENOUS at 16:00

## 2022-10-31 RX ADMIN — NITROGLYCERIN SCH GM: 20 OINTMENT TOPICAL at 08:43

## 2022-10-31 RX ADMIN — LEVALBUTEROL HYDROCHLORIDE SCH MG: 1.25 SOLUTION, CONCENTRATE RESPIRATORY (INHALATION) at 20:24

## 2022-10-31 RX ADMIN — ALLOPURINOL SCH MG: 100 TABLET ORAL at 08:39

## 2022-10-31 RX ADMIN — LEVALBUTEROL HYDROCHLORIDE SCH MG: 1.25 SOLUTION, CONCENTRATE RESPIRATORY (INHALATION) at 13:30

## 2022-10-31 RX ADMIN — ENOXAPARIN SODIUM SCH MG: 80 INJECTION SUBCUTANEOUS at 07:34

## 2022-10-31 RX ADMIN — Medication SCH MG: at 01:39

## 2022-10-31 RX ADMIN — TAMSULOSIN HYDROCHLORIDE SCH MG: 0.4 CAPSULE ORAL at 08:39

## 2022-10-31 RX ADMIN — Medication SCH MG: at 13:30

## 2022-10-31 RX ADMIN — PANTOPRAZOLE SODIUM SCH MG: 40 TABLET, DELAYED RELEASE ORAL at 07:35

## 2022-10-31 RX ADMIN — Medication SCH MG: at 07:45

## 2022-10-31 NOTE — NUR
RN OPENING NOTE



PT RECEIVED IN BED, AWAKE, A/O X2, CONFUSED AT TIMES. PT ON SIMPLE MASK AT 8 LPM WITH 
CURRENT O2 SAT OF >95%. NO S/SX OF ACUTE DISTRESS NOTED AT THIS TIME. IV ACCESS NOTED ON JACQUE 
ML,PATENT AND INTACT, RUNNING NS TKO. SOFT WRIST RESTRAINTS IN PLACE ON BILATERAL ARMS. NO 
CIRCULATION ISSUES NOTED. ALL SAFETY MEASURES IN PLACE : BED IN LOW POSITION,BED ALARM ON, 
CALL LIGHT WITHIN REACH, SIDE RAILS UP X3. WILL CONTINUE TO MONITOR THROUGHOUT THE NIGHT.

## 2022-10-31 NOTE — NUR
RN NOTE

PATIENT TRANSFERRED TO THE ALMA ROOM 103 , WILL CONTINUE TO PROVIDE CARE TO THE PATIENT IN 
THE ALMA THROUGHT OUT THE SHIFT

## 2022-10-31 NOTE — NUR
RN NOTE

PATIENT SCEDUPED FOR HEAD CT SCAN , BUT PATIENT IS NON COOPERATIVE , MD NOTIFIED , ORDER 
RECEIVED TO GIVE TO THE PATIENT QUETIAPINE 25 MG   1/2 TAB PRIOR THE CT SCAN

## 2022-10-31 NOTE — NUR
RN CLOSE NOTE

PT  IN BED, AWAKE, A&O X1  CONFUSED. PT ON NON-REBREATHER MASK AT 8 LPM WITH CURRENT O2SAT 
OF 93%. , HR NOTED TO BE ELEVATED  ST. MIDLINE WAS PLACE ON JACQUE .PATIENT IS ON 
BILATERAL RESTRAIN , CAUSE KEEP REMOVING HIS 02 MASK AND TRYING TO GET OUT OF BED STATING 
THAT HE NEEDS TO GET TO THE BANK .PATIENT IS BACK TO SOFT MECHANICAL DIET , BUT DIDN'T EAT 
AND DIDN'T DRINK TODAY. BED IS IN LOWEST POSITION, CALL LIGHT WITHIN REACH, SIDE RAILS UP 
X3. WILL ENDORSE NIGHT SHIFT NURSE TO CONTINUE TO FALLOW POC

## 2022-10-31 NOTE — NUR
ICU RN CLOSING NOTE

PT REMAINS IN BED, AWAKE, A&O X2, CONFUSED. PT ON NON-REBREATHER MASK; RT TITRATED O2 DOWN 
FROM 15 LPM TO 8 LPM; WITH CURRENT O2SAT OF 96%. PT ATTACHED TO EXTERNAL MONITOR, ST WITH HR 
. RESTRAINTS APPLIED TO PT AT AROUND 0030 D/T PULLING OUT MIDLINE AND MONITORS; NO S/S 
OF IMPAIRED SKIN OR CIRCULATION; PT PROVIDED WITH HYGIENE, RELEASE OF RESTRAINTS. ALL DUE 
MEDS ADMINISTERED DURING THE NIGHT. BED IN LOWEST POSITION, CALL LIGHT WITHIN REACH, SIDE 
RAILS UP X3. WILL ENDORSE TO DAYSHIFT NURSE TO CONTINUE CARE.

## 2022-10-31 NOTE — NUR
ICU RN OPENING NOTE

PT RECEIVED IN BED, AWAKE, A&O X2, CONFUSED. PT ON NON-REBREATHER MASK AT 15 LPM WITH 
CURRENT O2SAT OF 93%. PT REFUSES TO BE ATTACHED TO EXTERNAL MONITOR, HR NOTED TO BE ELEVATED 
. PATIENT REMOVED THE MID ,LINE AND DIDNT RECEIVED HIS 5 AM .BED IS IN LOWEST 
POSITION, CALL LIGHT WITHIN REACH, SIDE RAILS UP X3. WILL CONTINUE TO MONITOR THROUGHOUT THE 
NIGHT.

## 2022-11-01 VITALS — SYSTOLIC BLOOD PRESSURE: 124 MMHG | DIASTOLIC BLOOD PRESSURE: 63 MMHG

## 2022-11-01 VITALS — SYSTOLIC BLOOD PRESSURE: 151 MMHG | DIASTOLIC BLOOD PRESSURE: 90 MMHG

## 2022-11-01 VITALS — SYSTOLIC BLOOD PRESSURE: 147 MMHG | DIASTOLIC BLOOD PRESSURE: 82 MMHG

## 2022-11-01 VITALS — DIASTOLIC BLOOD PRESSURE: 70 MMHG | SYSTOLIC BLOOD PRESSURE: 130 MMHG

## 2022-11-01 VITALS — SYSTOLIC BLOOD PRESSURE: 155 MMHG | DIASTOLIC BLOOD PRESSURE: 90 MMHG

## 2022-11-01 VITALS — SYSTOLIC BLOOD PRESSURE: 138 MMHG | DIASTOLIC BLOOD PRESSURE: 80 MMHG

## 2022-11-01 LAB
BASOPHILS # BLD AUTO: 0 K/UL (ref 0–0.2)
BASOPHILS NFR BLD AUTO: 0 % (ref 0–2)
BUN SERPL-MCNC: 29 MG/DL (ref 7–18)
CALCIUM SERPL-MCNC: 9 MG/DL (ref 8.5–10.1)
CHLORIDE SERPL-SCNC: 112 MMOL/L (ref 98–107)
CO2 SERPL-SCNC: 30 MMOL/L (ref 21–32)
CREAT SERPL-MCNC: 1.1 MG/DL (ref 0.6–1.3)
EOSINOPHIL NFR BLD AUTO: 0 % (ref 0–6)
GLUCOSE SERPL-MCNC: 135 MG/DL (ref 74–106)
HCT VFR BLD AUTO: 54 % (ref 39–51)
HGB BLD-MCNC: 17.1 G/DL (ref 13.5–17.5)
LYMPHOCYTES NFR BLD AUTO: 0.2 K/UL (ref 0.8–4.8)
LYMPHOCYTES NFR BLD AUTO: 1.4 % (ref 20–44)
LYMPHOCYTES NFR BLD MANUAL: 2 % (ref 16–48)
MAGNESIUM SERPL-MCNC: 2.4 MG/DL (ref 1.8–2.4)
MCHC RBC AUTO-ENTMCNC: 32 G/DL (ref 31–36)
MCV RBC AUTO: 86 FL (ref 80–96)
MONOCYTES NFR BLD AUTO: 0.9 K/UL (ref 0.1–1.3)
MONOCYTES NFR BLD AUTO: 5.3 % (ref 2–12)
MONOCYTES NFR BLD MANUAL: 7 % (ref 0–11)
NEUTROPHILS # BLD AUTO: 16.1 K/UL (ref 1.8–8.9)
NEUTROPHILS NFR BLD AUTO: 93.3 % (ref 43–81)
NEUTS BAND NFR BLD MANUAL: 5 % (ref 0–5)
NEUTS SEG NFR BLD MANUAL: 86 % (ref 42–76)
PHOSPHATE SERPL-MCNC: 4.8 MG/DL (ref 2.5–4.9)
PLATELET # BLD AUTO: 261 K/UL (ref 150–450)
POTASSIUM SERPL-SCNC: 4.1 MMOL/L (ref 3.5–5.1)
RBC # BLD AUTO: 6.21 MIL/UL (ref 4.5–6)
SODIUM SERPL-SCNC: 149 MMOL/L (ref 136–145)
WBC NRBC COR # BLD AUTO: 17.2 K/UL (ref 4.3–11)

## 2022-11-01 RX ADMIN — DEXTROSE MONOHYDRATE SCH MLS/HR: 50 INJECTION, SOLUTION INTRAVENOUS at 16:40

## 2022-11-01 RX ADMIN — LEVALBUTEROL HYDROCHLORIDE SCH MG: 1.25 SOLUTION, CONCENTRATE RESPIRATORY (INHALATION) at 01:54

## 2022-11-01 RX ADMIN — TAMSULOSIN HYDROCHLORIDE SCH MG: 0.4 CAPSULE ORAL at 09:23

## 2022-11-01 RX ADMIN — HYDROXYUREA SCH MG: 500 CAPSULE ORAL at 23:09

## 2022-11-01 RX ADMIN — FINASTERIDE SCH MG: 5 TABLET, FILM COATED ORAL at 09:23

## 2022-11-01 RX ADMIN — LATANOPROST SCH DROP: 50 SOLUTION OPHTHALMIC at 21:21

## 2022-11-01 RX ADMIN — Medication SCH MG: at 01:54

## 2022-11-01 RX ADMIN — DEXTROSE MONOHYDRATE SCH MLS/HR: 50 INJECTION, SOLUTION INTRAVENOUS at 04:24

## 2022-11-01 RX ADMIN — Medication SCH MG: at 13:35

## 2022-11-01 RX ADMIN — LEVALBUTEROL HYDROCHLORIDE SCH MG: 1.25 SOLUTION, CONCENTRATE RESPIRATORY (INHALATION) at 20:04

## 2022-11-01 RX ADMIN — ENOXAPARIN SODIUM SCH MG: 80 INJECTION SUBCUTANEOUS at 18:07

## 2022-11-01 RX ADMIN — NITROGLYCERIN SCH GM: 20 OINTMENT TOPICAL at 09:26

## 2022-11-01 RX ADMIN — HYDROXYUREA SCH MG: 500 CAPSULE ORAL at 09:22

## 2022-11-01 RX ADMIN — LEVALBUTEROL HYDROCHLORIDE SCH MG: 1.25 SOLUTION, CONCENTRATE RESPIRATORY (INHALATION) at 08:24

## 2022-11-01 RX ADMIN — PANTOPRAZOLE SODIUM SCH MG: 40 TABLET, DELAYED RELEASE ORAL at 08:10

## 2022-11-01 RX ADMIN — NITROGLYCERIN SCH GM: 20 OINTMENT TOPICAL at 21:21

## 2022-11-01 RX ADMIN — ALLOPURINOL SCH MG: 100 TABLET ORAL at 16:40

## 2022-11-01 RX ADMIN — ATORVASTATIN CALCIUM SCH MG: 10 TABLET, FILM COATED ORAL at 21:22

## 2022-11-01 RX ADMIN — Medication SCH MG: at 08:24

## 2022-11-01 RX ADMIN — ENOXAPARIN SODIUM SCH MG: 80 INJECTION SUBCUTANEOUS at 06:44

## 2022-11-01 RX ADMIN — ALLOPURINOL SCH MG: 100 TABLET ORAL at 15:33

## 2022-11-01 RX ADMIN — ALLOPURINOL SCH MG: 100 TABLET ORAL at 09:23

## 2022-11-01 RX ADMIN — SODIUM CHLORIDE PRN MLS/HR: 9 INJECTION, SOLUTION INTRAVENOUS at 14:47

## 2022-11-01 RX ADMIN — Medication SCH MG: at 20:04

## 2022-11-01 RX ADMIN — HYDROXYUREA SCH MG: 500 CAPSULE ORAL at 16:40

## 2022-11-01 RX ADMIN — LEVALBUTEROL HYDROCHLORIDE SCH MG: 1.25 SOLUTION, CONCENTRATE RESPIRATORY (INHALATION) at 13:35

## 2022-11-01 NOTE — NUR
TELE RN AM NOTES 



PT IN BED, AWAKE, A/O X2, CONFUSED AT TIMES. PT ON SIMPLE MASK AT 8 LPM WITH CURRENT O2 SAT 
OF >95%. NO S/SX OF ACUTE DISTRESS NOTED AT THIS TIME. SR OCCASIONAL PVC HR 65, NO SIGN SOF 
PAIN/DISCOMFORT, IV ACCESS NOTED ON JACQUE ML,PATENT AND INTACT, RUNNING NS TKO. SITE CLEAR. 
SOFT WRIST RESTRAINTS IN PLACE ON BILATERAL ARMS. RELEASED. CHECKED FOR CIRCULATION AND 
PULSE THEN Q 2 HOURS. ALL SAFETY MEASURES IN PLACE : BED IN LOW POSITION,BED ALARM ON, CALL 
LIGHT WITHIN REACH, SIDE RAILS UP X3. WILL CONTINUE TO MONITOR.

## 2022-11-01 NOTE — NUR
TELE RN CLOSING NOTES 



PT RESTING IN BED, AWAKE, A/O X2, CONFUSED AT TIMES. PT ON SIMPLE MASK AT 8 LPM WITH CURRENT 
O2 SAT OF >90%. NO S/SX OF ACUTE DISTRESS NOTED AT THIS TIME. SR OCCASIONAL PVC HR 89, NO 
SIGN SOF PAIN/DISCOMFORT, IV ACCESS NOTED ON JACQUE ML,PATENT AND INTACT, RUNNING NS TKO. SITE 
CLEAR. SOFT WRIST RESTRAINTS IN PLACE ON BILATERAL WRIST. RELEASED. CHECKED FOR CIRCULATION 
AND PULSE Q 2 HOURS. PERFORMED PM CARE EARLIER. ALL SAFETY MEASURES IN PLACE : BED IN LOW 
POSITION,BED ALARM ON, CALL LIGHT WITHIN REACH, SIDE RAILS UP X3. ALL NEEDS MET. TURNED AND 
REPOSITION Q 2 HOURS. WILL ENDORSE TO NEXT SHIFT FOR ADONIS



UNABLE TO PERFORM MRI HEAD WO DUE TO PATIENT DESATURATING EARLIER. WILL TRY AGAIN TOMORROW. 
CHECKLIST ON FILE. MD AWARE.

## 2022-11-02 VITALS — DIASTOLIC BLOOD PRESSURE: 82 MMHG | SYSTOLIC BLOOD PRESSURE: 148 MMHG

## 2022-11-02 VITALS — DIASTOLIC BLOOD PRESSURE: 54 MMHG | SYSTOLIC BLOOD PRESSURE: 149 MMHG

## 2022-11-02 VITALS — DIASTOLIC BLOOD PRESSURE: 97 MMHG | SYSTOLIC BLOOD PRESSURE: 159 MMHG

## 2022-11-02 VITALS — DIASTOLIC BLOOD PRESSURE: 79 MMHG | SYSTOLIC BLOOD PRESSURE: 138 MMHG

## 2022-11-02 VITALS — SYSTOLIC BLOOD PRESSURE: 120 MMHG | DIASTOLIC BLOOD PRESSURE: 77 MMHG

## 2022-11-02 LAB
BASOPHILS # BLD AUTO: 0 K/UL (ref 0–0.2)
BASOPHILS NFR BLD AUTO: 0 % (ref 0–2)
BUN SERPL-MCNC: 33 MG/DL (ref 7–18)
CALCIUM SERPL-MCNC: 9 MG/DL (ref 8.5–10.1)
CHLORIDE SERPL-SCNC: 111 MMOL/L (ref 98–107)
CO2 SERPL-SCNC: 28 MMOL/L (ref 21–32)
CREAT SERPL-MCNC: 1 MG/DL (ref 0.6–1.3)
EOSINOPHIL NFR BLD AUTO: 0 % (ref 0–6)
GLUCOSE SERPL-MCNC: 142 MG/DL (ref 74–106)
HCT VFR BLD AUTO: 56 % (ref 39–51)
HGB BLD-MCNC: 17.8 G/DL (ref 13.5–17.5)
LYMPHOCYTES NFR BLD AUTO: 0.1 K/UL (ref 0.8–4.8)
LYMPHOCYTES NFR BLD AUTO: 0.8 % (ref 20–44)
LYMPHOCYTES NFR BLD MANUAL: 3 % (ref 16–48)
MAGNESIUM SERPL-MCNC: 2.7 MG/DL (ref 1.8–2.4)
MCHC RBC AUTO-ENTMCNC: 32 G/DL (ref 31–36)
MCV RBC AUTO: 86 FL (ref 80–96)
MONOCYTES NFR BLD AUTO: 1 K/UL (ref 0.1–1.3)
MONOCYTES NFR BLD AUTO: 5.7 % (ref 2–12)
NEUTROPHILS # BLD AUTO: 16.5 K/UL (ref 1.8–8.9)
NEUTROPHILS NFR BLD AUTO: 93.5 % (ref 43–81)
NEUTS BAND NFR BLD MANUAL: 5 % (ref 0–5)
NEUTS SEG NFR BLD MANUAL: 92 % (ref 42–76)
PHOSPHATE SERPL-MCNC: 3.9 MG/DL (ref 2.5–4.9)
PLATELET # BLD AUTO: 236 K/UL (ref 150–450)
POTASSIUM SERPL-SCNC: 4.2 MMOL/L (ref 3.5–5.1)
RBC # BLD AUTO: 6.54 MIL/UL (ref 4.5–6)
SODIUM SERPL-SCNC: 147 MMOL/L (ref 136–145)
WBC NRBC COR # BLD AUTO: 17.7 K/UL (ref 4.3–11)

## 2022-11-02 RX ADMIN — FINASTERIDE SCH MG: 5 TABLET, FILM COATED ORAL at 08:39

## 2022-11-02 RX ADMIN — LEVALBUTEROL HYDROCHLORIDE SCH MG: 1.25 SOLUTION, CONCENTRATE RESPIRATORY (INHALATION) at 00:39

## 2022-11-02 RX ADMIN — Medication SCH MG: at 14:26

## 2022-11-02 RX ADMIN — PANTOPRAZOLE SODIUM SCH MG: 40 TABLET, DELAYED RELEASE ORAL at 08:09

## 2022-11-02 RX ADMIN — ATORVASTATIN CALCIUM SCH MG: 10 TABLET, FILM COATED ORAL at 21:37

## 2022-11-02 RX ADMIN — ENOXAPARIN SODIUM SCH MG: 80 INJECTION SUBCUTANEOUS at 06:05

## 2022-11-02 RX ADMIN — ALLOPURINOL SCH MG: 100 TABLET ORAL at 17:43

## 2022-11-02 RX ADMIN — ENOXAPARIN SODIUM SCH MG: 80 INJECTION SUBCUTANEOUS at 18:52

## 2022-11-02 RX ADMIN — Medication SCH MG: at 19:56

## 2022-11-02 RX ADMIN — HYDROXYUREA SCH MG: 500 CAPSULE ORAL at 08:39

## 2022-11-02 RX ADMIN — LEVALBUTEROL HYDROCHLORIDE SCH MG: 1.25 SOLUTION, CONCENTRATE RESPIRATORY (INHALATION) at 14:26

## 2022-11-02 RX ADMIN — HYDROXYUREA SCH MG: 500 CAPSULE ORAL at 17:44

## 2022-11-02 RX ADMIN — ALLOPURINOL SCH MG: 100 TABLET ORAL at 08:39

## 2022-11-02 RX ADMIN — Medication SCH MG: at 00:39

## 2022-11-02 RX ADMIN — NITROGLYCERIN SCH GM: 20 OINTMENT TOPICAL at 21:36

## 2022-11-02 RX ADMIN — ALLOPURINOL SCH MG: 100 TABLET ORAL at 13:38

## 2022-11-02 RX ADMIN — DEXTROSE MONOHYDRATE SCH MLS/HR: 50 INJECTION, SOLUTION INTRAVENOUS at 04:57

## 2022-11-02 RX ADMIN — Medication SCH MG: at 07:48

## 2022-11-02 RX ADMIN — LEVALBUTEROL HYDROCHLORIDE SCH MG: 1.25 SOLUTION, CONCENTRATE RESPIRATORY (INHALATION) at 19:56

## 2022-11-02 RX ADMIN — LATANOPROST SCH DROP: 50 SOLUTION OPHTHALMIC at 21:35

## 2022-11-02 RX ADMIN — ASPIRIN 81 MG SCH MG: 81 TABLET ORAL at 20:38

## 2022-11-02 RX ADMIN — NITROGLYCERIN SCH GM: 20 OINTMENT TOPICAL at 08:39

## 2022-11-02 RX ADMIN — LEVALBUTEROL HYDROCHLORIDE SCH MG: 1.25 SOLUTION, CONCENTRATE RESPIRATORY (INHALATION) at 07:48

## 2022-11-02 RX ADMIN — TAMSULOSIN HYDROCHLORIDE SCH MG: 0.4 CAPSULE ORAL at 08:39

## 2022-11-02 NOTE — NUR
RN NOTE



ALL DUE MEDS GIVEN. NEEDS ATTENDED TO.



PT REMAINED STABLE T/O THE NIGHT.



WILL ENDORSE TO AM SHIFT NURSE FOR ADONIS.

## 2022-11-02 NOTE — NUR
TELE RN OPENING NOTE



RECEIVED PT IN BED RESTING IN BED, AWAKE, A/O X2, CONFUSED AT TIMES. PT ON SIMPLE MASK AT 8 
LPM WITH CURRENT O2 SAT OF 93%. NO S/SX OF ACUTE DISTRESS NOTED AT THIS TIME. TELE MONITOR 
SR HR 89 NO SIGN SOF PAIN/DISCOMFORT, IV ACCESS NOTED ON JACQUE ML,PATENT AND INTACT. BILATERAL 
SOFT WRIST RESTRAINTS AT BEDSIDE. ALL SAFETY MEASURES IN PLACE; BED LOCKED IN LOW POSITION, 
BED ALARM ON, CALL LIGHT WITHIN REACH, SIDE RAILS UP X3. WILL CONTINUE TO MONITOR THROUGHOUT 
SHIFT.

## 2022-11-02 NOTE — NUR
RN NOTE



NOTIFIED DR MARISSA KELLOGG OF MRI RESULTS.

-------------------------------------------------------------------------------

Addendum: 11/02/22 at 1612 by YUE GONZALEZ RN

-------------------------------------------------------------------------------

RN NOTE 

NOTIFIED MD JOSEPH

## 2022-11-02 NOTE — NUR
TELE RN CLOSING NOTE



PT IN BED RESTING IN BED, AWAKE, A/O X3. PT ON SIMPLE MASK AT 8 LPM WITH CURRENT O2 SAT OF 
94%. NO S/SX OF ACUTE DISTRESS NOTED AT THIS TIME. TELE MONITOR SR HR 85 NO SIGN SOF 
PAIN/DISCOMFORT, IV ACCESS NOTED ON JACQUE ML, PATENT AND INTACT. ALL SAFETY MEASURES IN PLACE; 
BED LOCKED IN LOW POSITION, BED ALARM ON, CALL LIGHT WITHIN REACH, SIDE RAILS UP X3. WILL 
ENDORSE CONTINUITY OF CARE TO NIGHT SHIFT.

## 2022-11-02 NOTE — NUR
RN CLOSING NOTE



PT RECEIVED IN BED, AWAKE, A/O X2. VERBALLY RESPONSIVE. PT ON SIMPLE MASK AT 8 LPM WITH 
CURRENT O2 SAT OF >95%. NO S/SX OF ACUTE DISTRESS NOTED AT THIS TIME. IV ACCESS NOTED ON JACQUE 
ML,PATENT AND INTACT, RUNNING NS TKO. SOFT WRIST RESTRAINTS IN PLACE ON BILATERAL ARMS. NO 
CIRCULATION ISSUES NOTED. ALL SAFETY MEASURES IN PLACE : BED IN LOW POSITION,BED ALARM ON, 
CALL LIGHT WITHIN REACH, SIDE RAILS UP X3. WILL CONTINUE TO MONITOR THROUGHOUT THE NIGHT.

## 2022-11-02 NOTE — NUR
TELE RN OPENING NOTE



RECEIVED PT IN BED  AWAKE, A/O X2, CONFUSED AT TIMES. PT ON SIMPLE MASK AT 8 LPM WITH 
CURRENT O2 SAT OF 96%. NO S/S OF ACUTE DISTRESS, TELE MONITOR SR HR 88,  IV ACCESS NOTED ON 
JACQUE ML,PATENT AND INTACT. ALL SAFETY MEASURES IN PLACE; BED LOCKED IN LOW POSITION, BED 
ALARM ON, CALL LIGHT WITHIN REACH, SIDE RAILS UP X3. WILL CONTINUE TO MONITOR THROUGHOUT 
SHIFT.

## 2022-11-02 NOTE — NUR
RN NOTE



KRISTY CAME TO  PT FOR MRI ON [facility]. ANTICIPATED RETURN TIME 1535

## 2022-11-03 VITALS — SYSTOLIC BLOOD PRESSURE: 112 MMHG | DIASTOLIC BLOOD PRESSURE: 49 MMHG

## 2022-11-03 VITALS — SYSTOLIC BLOOD PRESSURE: 147 MMHG | DIASTOLIC BLOOD PRESSURE: 87 MMHG

## 2022-11-03 VITALS — SYSTOLIC BLOOD PRESSURE: 162 MMHG | DIASTOLIC BLOOD PRESSURE: 87 MMHG

## 2022-11-03 VITALS — DIASTOLIC BLOOD PRESSURE: 84 MMHG | SYSTOLIC BLOOD PRESSURE: 158 MMHG

## 2022-11-03 VITALS — DIASTOLIC BLOOD PRESSURE: 89 MMHG | SYSTOLIC BLOOD PRESSURE: 152 MMHG

## 2022-11-03 VITALS — DIASTOLIC BLOOD PRESSURE: 78 MMHG | SYSTOLIC BLOOD PRESSURE: 139 MMHG

## 2022-11-03 LAB
BASOPHILS # BLD AUTO: 0 K/UL (ref 0–0.2)
BASOPHILS NFR BLD AUTO: 0.1 % (ref 0–2)
EOSINOPHIL NFR BLD AUTO: 0 % (ref 0–6)
HCT VFR BLD AUTO: 56 % (ref 39–51)
HGB BLD-MCNC: 17.7 G/DL (ref 13.5–17.5)
LYMPHOCYTES NFR BLD AUTO: 0.2 K/UL (ref 0.8–4.8)
LYMPHOCYTES NFR BLD AUTO: 1 % (ref 20–44)
LYMPHOCYTES NFR BLD MANUAL: 4 % (ref 16–48)
MCHC RBC AUTO-ENTMCNC: 31 G/DL (ref 31–36)
MCV RBC AUTO: 87 FL (ref 80–96)
MONOCYTES NFR BLD AUTO: 1 K/UL (ref 0.1–1.3)
MONOCYTES NFR BLD AUTO: 5.6 % (ref 2–12)
MONOCYTES NFR BLD MANUAL: 2 % (ref 0–11)
NEUTROPHILS # BLD AUTO: 15.9 K/UL (ref 1.8–8.9)
NEUTROPHILS NFR BLD AUTO: 93.3 % (ref 43–81)
NEUTS BAND NFR BLD MANUAL: 2 % (ref 0–5)
NEUTS SEG NFR BLD MANUAL: 92 % (ref 42–76)
PLATELET # BLD AUTO: 177 K/UL (ref 150–450)
RBC # BLD AUTO: 6.44 MIL/UL (ref 4.5–6)
WBC NRBC COR # BLD AUTO: 17.1 K/UL (ref 4.3–11)

## 2022-11-03 RX ADMIN — TAMSULOSIN HYDROCHLORIDE SCH MG: 0.4 CAPSULE ORAL at 08:07

## 2022-11-03 RX ADMIN — ALLOPURINOL SCH MG: 100 TABLET ORAL at 12:42

## 2022-11-03 RX ADMIN — LEVALBUTEROL HYDROCHLORIDE SCH MG: 1.25 SOLUTION, CONCENTRATE RESPIRATORY (INHALATION) at 19:49

## 2022-11-03 RX ADMIN — Medication SCH MG: at 08:03

## 2022-11-03 RX ADMIN — LEVALBUTEROL HYDROCHLORIDE SCH MG: 1.25 SOLUTION, CONCENTRATE RESPIRATORY (INHALATION) at 14:25

## 2022-11-03 RX ADMIN — ATORVASTATIN CALCIUM SCH MG: 10 TABLET, FILM COATED ORAL at 22:13

## 2022-11-03 RX ADMIN — PANTOPRAZOLE SODIUM SCH MG: 40 TABLET, DELAYED RELEASE ORAL at 08:06

## 2022-11-03 RX ADMIN — ALLOPURINOL SCH MG: 100 TABLET ORAL at 16:14

## 2022-11-03 RX ADMIN — Medication SCH MG: at 19:49

## 2022-11-03 RX ADMIN — HYDROXYUREA SCH MG: 500 CAPSULE ORAL at 08:06

## 2022-11-03 RX ADMIN — ENOXAPARIN SODIUM SCH MG: 80 INJECTION SUBCUTANEOUS at 18:10

## 2022-11-03 RX ADMIN — ASPIRIN 81 MG SCH MG: 81 TABLET ORAL at 08:06

## 2022-11-03 RX ADMIN — ENOXAPARIN SODIUM SCH MG: 80 INJECTION SUBCUTANEOUS at 07:12

## 2022-11-03 RX ADMIN — HYDROXYUREA SCH MG: 500 CAPSULE ORAL at 16:14

## 2022-11-03 RX ADMIN — Medication SCH MG: at 02:11

## 2022-11-03 RX ADMIN — ALLOPURINOL SCH MG: 100 TABLET ORAL at 08:06

## 2022-11-03 RX ADMIN — NITROGLYCERIN SCH GM: 20 OINTMENT TOPICAL at 22:15

## 2022-11-03 RX ADMIN — LEVALBUTEROL HYDROCHLORIDE SCH MG: 1.25 SOLUTION, CONCENTRATE RESPIRATORY (INHALATION) at 02:10

## 2022-11-03 RX ADMIN — HYDROXYUREA SCH MG: 500 CAPSULE ORAL at 12:42

## 2022-11-03 RX ADMIN — NITROGLYCERIN SCH GM: 20 OINTMENT TOPICAL at 08:08

## 2022-11-03 RX ADMIN — FINASTERIDE SCH MG: 5 TABLET, FILM COATED ORAL at 08:06

## 2022-11-03 RX ADMIN — LATANOPROST SCH DROP: 50 SOLUTION OPHTHALMIC at 22:13

## 2022-11-03 RX ADMIN — Medication SCH MG: at 14:25

## 2022-11-03 RX ADMIN — LEVALBUTEROL HYDROCHLORIDE SCH MG: 1.25 SOLUTION, CONCENTRATE RESPIRATORY (INHALATION) at 08:03

## 2022-11-03 NOTE — NUR
tele  rn  note 

 noted very congested with coughing , rt called to do suction  large amt of secretion 
obtained , placed on nc 8l saturation 93% will monitor

## 2022-11-03 NOTE — NUR
TELE RN NOTE

 PATIENT IN BED, ALERT WITH SOME CONFUSION , ON 10L SIMPLE MASK ,SATURATION 92% , NO SOB  NO 
DISTRESS NOTED, PTS STILL NOTED  WITH CONGESTION  STILL WITH MOD AMT SECRETION  SUCTION DONE 
AS NEEDED,  ON TELE MONITOR  SR  AT THIS TIME,  ON ANDRESSA SOFT WRIST RESTRAINT  TO 
PREVENT FROM PULLING  INVASIVE TUBING , RT UPPER ARM MID LINE IN PLACE AND FLUSHED WELL , 
BED IN LOWEST AND LOCKED POSITION , SAFETY MEASURE IMPLEMENTED, WILL CONT TO MONITOR

## 2022-11-03 NOTE — NUR
TELE RN CLOSING NOTE



 PT IN BED  AWAKE, A/O X2, CONFUSED AT TIMES. PT ON SIMPLE MASK AT 8 LPM WITH CURRENT O2 SAT 
OF 96%. NO S/S OF ACUTE DISTRESS, TELE MONITOR SR HR 88,  IV ACCESS NOTED ON JACQUE ML,PATENT 
AND INTACT.ALL DUE MEDS GIVEN.ALL SAFETY MEASURES IN PLACE; BED LOCKED IN LOW POSITION, BED 
ALARM ON, CALL LIGHT WITHIN REACH, SIDE RAILS UP X3. WILL ENDORSE TO MORNING NURSE FOR ADONIS.

## 2022-11-03 NOTE — NUR
RT



Attempted to titrate Fi02 throughout shift (NC 6 LPM). Pt aware and alert.

Placed back on SM 10 LPM due to desaturation. Will continue to monitor. RN aware

## 2022-11-03 NOTE — NUR
tele rn note 

 still congested despite suction , called to dr vasques notified that patient  still very 
congested ordered Lasix 40 mg iv time one ,order carried out

## 2022-11-03 NOTE — NUR
TELE RN NOTE

 PATIENT IN BED, ALERT WITH SOME CONFUSION , ON 10L SIMPLE MASK ,SATURATION 91% AT THIS 
TIME, FAMILY AT BEDSIDE REFUSED TO EAT DINNER, STILL WITH MOD AMT SECRETION  SUCTION DONE AS 
NEEDED,  ON TELE MONITOR  SR HR 99, AT THIS TIME,  ,RELEASED  SOFT RESTRAIN AT THIS TIME DUR 
TO FAMILY AT BEDSIDE  , RT UPPER ARM MID LINE IN PLACE AND FLUSHED WELL , BED IN LOWEST AND 
LOCKED POSITION , SAFETY MEASURE IMPLEMENTED WILL CONT TO MONITOR

## 2022-11-03 NOTE — NUR
TELE RN  NOTE



 PT IN BED  AWAKE, A/O X2, CONFUSED AT TIMES. PT ON SIMPLE MASK AT 8 LPM   NO S/S OF ACUTE 
DISTRESS,NO SOB AT THIS TIME BUT NOTED OCCASIONAL  COUGH ,TELE MONITOR SR HR 84,  IV ACCESS 
NOTED ON JACQUE ML ,SAFETY MEASURES IN PLACE; BED LOCKED IN LOW POSITION, BED ALARM ON, CALL 
LIGHT WITHIN REACH, SIDE RAILS UP. WILL CONT TO MONITOR CLOSELY

## 2022-11-03 NOTE — NUR
TELE RN NOTE 

 TRYING TO REMOVE O2 MASKS, AT AT RISK TO FALL ,TRYING TO GET OUT OFF BED  ,STAY WITH 
PATIENT AND EXPLAINED NOT GET OUT OFF BED STILL NONCOMPLIANT ,SPOKE WITH DR TRINIDAD OK TO 
APPLY SOFT RESTRAIN  , SPOKE WITH SON ABHILASH EMPLANED ABOUT OF RESTRAIN STATED OK TO PLACE

## 2022-11-03 NOTE — NUR
tele rn note 

 dr freeman neurologist at beside patient condition updated aware that patient still  with  
chest congestion

## 2022-11-03 NOTE — NUR
TELE RN NOTE

 PER DR RE ROUSSEAU TO ORDER PT ALSO GAVE PHONE NUMBER OF SON  TO DISCUSSED MRI RESULT PER 
SON REQUEST

## 2022-11-03 NOTE — NUR
TELE RN NOTE 

 RT AT BEDSIDE, PLACED BACK TO SIMPLE MASK DUE TO SATURATION 88% , PLACED 10 L  BY SIMPLE 
MASK AND SATURATION 91% WILL MONITOR CLOSELY

## 2022-11-04 VITALS — DIASTOLIC BLOOD PRESSURE: 77 MMHG | SYSTOLIC BLOOD PRESSURE: 144 MMHG

## 2022-11-04 VITALS — SYSTOLIC BLOOD PRESSURE: 128 MMHG | DIASTOLIC BLOOD PRESSURE: 82 MMHG

## 2022-11-04 VITALS — SYSTOLIC BLOOD PRESSURE: 147 MMHG | DIASTOLIC BLOOD PRESSURE: 86 MMHG

## 2022-11-04 VITALS — DIASTOLIC BLOOD PRESSURE: 70 MMHG | SYSTOLIC BLOOD PRESSURE: 127 MMHG

## 2022-11-04 VITALS — SYSTOLIC BLOOD PRESSURE: 157 MMHG | DIASTOLIC BLOOD PRESSURE: 101 MMHG

## 2022-11-04 VITALS — SYSTOLIC BLOOD PRESSURE: 175 MMHG | DIASTOLIC BLOOD PRESSURE: 106 MMHG

## 2022-11-04 LAB
BASE EXCESS BLDA CALC-SCNC: 3.5 MMOL/L
BASOPHILS # BLD AUTO: 0.1 K/UL (ref 0–0.2)
BASOPHILS NFR BLD AUTO: 0.5 % (ref 0–2)
BUN SERPL-MCNC: 33 MG/DL (ref 7–18)
CALCIUM SERPL-MCNC: 8.2 MG/DL (ref 8.5–10.1)
CHLORIDE SERPL-SCNC: 111 MMOL/L (ref 98–107)
CO2 SERPL-SCNC: 29 MMOL/L (ref 21–32)
CREAT SERPL-MCNC: 1 MG/DL (ref 0.6–1.3)
DO-HGB MFR BLDA: 233.3 MMHG
EOSINOPHIL NFR BLD AUTO: 0 % (ref 0–6)
GLUCOSE SERPL-MCNC: 128 MG/DL (ref 74–106)
HCT VFR BLD AUTO: 53 % (ref 39–51)
HGB BLD-MCNC: 16.8 G/DL (ref 13.5–17.5)
INHALED O2 CONCENTRATION: 50 %
INHALED O2 FLOW RATE: 8 L/MIN (ref 0–30)
LYMPHOCYTES NFR BLD AUTO: 0.1 K/UL (ref 0.8–4.8)
LYMPHOCYTES NFR BLD AUTO: 0.5 % (ref 20–44)
LYMPHOCYTES NFR BLD MANUAL: 2 % (ref 16–48)
MAGNESIUM SERPL-MCNC: 2.4 MG/DL (ref 1.8–2.4)
MCHC RBC AUTO-ENTMCNC: 32 G/DL (ref 31–36)
MCV RBC AUTO: 86 FL (ref 80–96)
MONOCYTES NFR BLD AUTO: 0.4 K/UL (ref 0.1–1.3)
MONOCYTES NFR BLD AUTO: 2.3 % (ref 2–12)
MONOCYTES NFR BLD MANUAL: 1 % (ref 0–11)
NEUTROPHILS # BLD AUTO: 17.9 K/UL (ref 1.8–8.9)
NEUTROPHILS NFR BLD AUTO: 96.7 % (ref 43–81)
NEUTS BAND NFR BLD MANUAL: 4 % (ref 0–5)
NEUTS SEG NFR BLD MANUAL: 93 % (ref 42–76)
PCO2 TEMP ADJ BLDA: 46.7 MMHG (ref 35–45)
PH TEMP ADJ BLDA: 7.41 [PH] (ref 7.35–7.45)
PHOSPHATE SERPL-MCNC: 3.1 MG/DL (ref 2.5–4.9)
PLATELET # BLD AUTO: 210 K/UL (ref 150–450)
PO2 TEMP ADJ BLDA: 70.7 MMHG (ref 75–100)
POTASSIUM SERPL-SCNC: 4 MMOL/L (ref 3.5–5.1)
RBC # BLD AUTO: 6.15 MIL/UL (ref 4.5–6)
SAO2 % BLDA: 93.7 % (ref 92–98.5)
SODIUM SERPL-SCNC: 146 MMOL/L (ref 136–145)
WBC NRBC COR # BLD AUTO: 18.6 K/UL (ref 4.3–11)

## 2022-11-04 RX ADMIN — PANTOPRAZOLE SODIUM SCH MG: 40 TABLET, DELAYED RELEASE ORAL at 07:43

## 2022-11-04 RX ADMIN — Medication SCH MG: at 20:26

## 2022-11-04 RX ADMIN — ALLOPURINOL SCH MG: 100 TABLET ORAL at 16:53

## 2022-11-04 RX ADMIN — FINASTERIDE SCH MG: 5 TABLET, FILM COATED ORAL at 09:12

## 2022-11-04 RX ADMIN — Medication SCH MG: at 07:59

## 2022-11-04 RX ADMIN — TAMSULOSIN HYDROCHLORIDE SCH MG: 0.4 CAPSULE ORAL at 09:13

## 2022-11-04 RX ADMIN — ATORVASTATIN CALCIUM SCH MG: 10 TABLET, FILM COATED ORAL at 21:10

## 2022-11-04 RX ADMIN — Medication SCH OZ: at 09:18

## 2022-11-04 RX ADMIN — HYDROXYUREA SCH MG: 500 CAPSULE ORAL at 16:53

## 2022-11-04 RX ADMIN — LEVALBUTEROL HYDROCHLORIDE SCH MG: 1.25 SOLUTION, CONCENTRATE RESPIRATORY (INHALATION) at 20:26

## 2022-11-04 RX ADMIN — Medication SCH MG: at 02:03

## 2022-11-04 RX ADMIN — LEVALBUTEROL HYDROCHLORIDE SCH MG: 1.25 SOLUTION, CONCENTRATE RESPIRATORY (INHALATION) at 07:59

## 2022-11-04 RX ADMIN — ENOXAPARIN SODIUM SCH MG: 80 INJECTION SUBCUTANEOUS at 07:07

## 2022-11-04 RX ADMIN — LATANOPROST SCH DROP: 50 SOLUTION OPHTHALMIC at 21:10

## 2022-11-04 RX ADMIN — LEVALBUTEROL HYDROCHLORIDE SCH MG: 1.25 SOLUTION, CONCENTRATE RESPIRATORY (INHALATION) at 14:16

## 2022-11-04 RX ADMIN — ALLOPURINOL SCH MG: 100 TABLET ORAL at 13:33

## 2022-11-04 RX ADMIN — Medication SCH MG: at 14:15

## 2022-11-04 RX ADMIN — LEVALBUTEROL HYDROCHLORIDE SCH MG: 1.25 SOLUTION, CONCENTRATE RESPIRATORY (INHALATION) at 02:03

## 2022-11-04 RX ADMIN — HYDROXYUREA SCH MG: 500 CAPSULE ORAL at 09:12

## 2022-11-04 RX ADMIN — ENOXAPARIN SODIUM SCH MG: 80 INJECTION SUBCUTANEOUS at 18:44

## 2022-11-04 RX ADMIN — ALLOPURINOL SCH MG: 100 TABLET ORAL at 09:13

## 2022-11-04 RX ADMIN — ASPIRIN 81 MG SCH MG: 81 TABLET ORAL at 09:14

## 2022-11-04 RX ADMIN — NITROGLYCERIN SCH GM: 20 OINTMENT TOPICAL at 21:11

## 2022-11-04 RX ADMIN — NITROGLYCERIN SCH GM: 20 OINTMENT TOPICAL at 09:18

## 2022-11-04 NOTE — NUR
TELE RN CLOSING NOTE

PT IN BED  AWAKE, , CONFUSED PT ON SIMPLE MASK AT 8 LPM WITH CURRENT O2 SAT OF 96%. NO S/S 
OF ACUTE DISTRESS, TELE MONITOR SR HR 88,  IV ACCESS NOTED ON JACQUE ML,PATENT AND 
INTACT.PATIENT HAS BILATERAL HANDS RESTRAINED . SAFETY MEASURES IN PLACE; BED LOCKED IN LOW 
POSITION, BED ALARM ON, CALL LIGHT WITHIN REACH, SIDE RAILS UP X3. WILL ECONTINUE TO MONITOR 
AND FALLOW THE  POC.

## 2022-11-04 NOTE — NUR
TELE RN CLOSING NOTE

PT IN BED  AWAKE, , CONFUSED PT ON SIMPLE MASK AT 10 LPM WITH CURRENT O2 SAT OF 89%. NO S/S 
OF ACUTE DISTRESS, TELE MONITOR SR HR 88,  IV ACCESS NOTED ON JACQUE ML,PATENT AND 
INTACT.PATIENT HAS BILATERAL HANDS RESTRAINED . SAFETY MEASURES IN PLACE; BED LOCKED IN LOW 
POSITION, BED ALARM ON, CALL LIGHT WITHIN REACH, SIDE RAILS UP X3. WILL ENDORSE NIGHT SHIFT 
NURSE TO MONITOR AND FALLOW THE  POC.

## 2022-11-04 NOTE — NUR
RN OPENING NOTE



RECEIVED PATIENT IN BED, ASLEEP. CURRENTLY ON 8L SIMPLE MASK, SATING AT 98%. NO SOB, NO 
ACUTE RESPI DISTRESS NOTED AT THIS TIME, PT STILL NOTED TO HAVE CONGESTION WITH MODERATE AMT 
OF SECRETION. SUCTION DONE AS NEEDED,  ON TELE MONITOR  SR HR 70s. IV ACCESS IN RT UPPER ARM 
MID LINE IN PLACE, INTACT AND PATENT. ON ANDRESSA SOFT WRIST RESTRAINTS, NO CIRCULATION ISSUES 
NOTED. ALL SAFETY MEASURES IN PLACE: BED IN LOWEST AND LOCKED POSITION , BED ALARM ON, SR UP 
X 3, CALL LIGHT WITHIN REACH. WILL CONT TO MONITOR.

## 2022-11-04 NOTE — NUR
TELE RN NOTE

 PATIENT  REMAIN IN BED,  CONFUSED  AND AGITATED.  ON BILATERAL  SOFT WRIST  RESTRAINT .ON 
10L SIMPLE MASK ,SATURATION 95% AT THIS TIME,   ON TELE MONITOR  SR HR 97, AT THIS TIME, RT 
UPPER ARM MID LINE IN PLACE AND FLUSHED WELL , BED IN LOWEST AND LOCKED POSITION , SAFETY 
MEASURE IMPLEMENTED WILL ENDORSE TO RN DAY SHIFT  FOR CONTINUITY OF  CARE.

## 2022-11-05 VITALS — SYSTOLIC BLOOD PRESSURE: 107 MMHG | DIASTOLIC BLOOD PRESSURE: 60 MMHG

## 2022-11-05 VITALS — SYSTOLIC BLOOD PRESSURE: 108 MMHG | DIASTOLIC BLOOD PRESSURE: 58 MMHG

## 2022-11-05 VITALS — DIASTOLIC BLOOD PRESSURE: 75 MMHG | SYSTOLIC BLOOD PRESSURE: 128 MMHG

## 2022-11-05 VITALS — DIASTOLIC BLOOD PRESSURE: 66 MMHG | SYSTOLIC BLOOD PRESSURE: 120 MMHG

## 2022-11-05 VITALS — DIASTOLIC BLOOD PRESSURE: 58 MMHG | SYSTOLIC BLOOD PRESSURE: 98 MMHG

## 2022-11-05 VITALS — DIASTOLIC BLOOD PRESSURE: 70 MMHG | SYSTOLIC BLOOD PRESSURE: 119 MMHG

## 2022-11-05 LAB
BASOPHILS # BLD AUTO: 0.2 K/UL (ref 0–0.2)
BASOPHILS NFR BLD AUTO: 1.3 % (ref 0–2)
BUN SERPL-MCNC: 40 MG/DL (ref 7–18)
CALCIUM SERPL-MCNC: 8.6 MG/DL (ref 8.5–10.1)
CHLORIDE SERPL-SCNC: 113 MMOL/L (ref 98–107)
CO2 SERPL-SCNC: 34 MMOL/L (ref 21–32)
CREAT SERPL-MCNC: 1 MG/DL (ref 0.6–1.3)
EOSINOPHIL NFR BLD AUTO: 0 % (ref 0–6)
GLUCOSE SERPL-MCNC: 112 MG/DL (ref 74–106)
HCT VFR BLD AUTO: 46 % (ref 39–51)
HGB BLD-MCNC: 14.6 G/DL (ref 13.5–17.5)
LYMPHOCYTES NFR BLD AUTO: 0.2 K/UL (ref 0.8–4.8)
LYMPHOCYTES NFR BLD AUTO: 1.7 % (ref 20–44)
MAGNESIUM SERPL-MCNC: 2.6 MG/DL (ref 1.8–2.4)
MCHC RBC AUTO-ENTMCNC: 32 G/DL (ref 31–36)
MCV RBC AUTO: 86 FL (ref 80–96)
MONOCYTES NFR BLD AUTO: 0.7 K/UL (ref 0.1–1.3)
MONOCYTES NFR BLD AUTO: 4.5 % (ref 2–12)
NEUTROPHILS # BLD AUTO: 13.6 K/UL (ref 1.8–8.9)
NEUTROPHILS NFR BLD AUTO: 92.5 % (ref 43–81)
PHOSPHATE SERPL-MCNC: 3.9 MG/DL (ref 2.5–4.9)
PLATELET # BLD AUTO: 220 K/UL (ref 150–450)
POTASSIUM SERPL-SCNC: 4.2 MMOL/L (ref 3.5–5.1)
RBC # BLD AUTO: 5.31 MIL/UL (ref 4.5–6)
SODIUM SERPL-SCNC: 152 MMOL/L (ref 136–145)
WBC NRBC COR # BLD AUTO: 14.7 K/UL (ref 4.3–11)

## 2022-11-05 RX ADMIN — LEVALBUTEROL HYDROCHLORIDE SCH MG: 1.25 SOLUTION, CONCENTRATE RESPIRATORY (INHALATION) at 01:42

## 2022-11-05 RX ADMIN — ENOXAPARIN SODIUM SCH MG: 80 INJECTION SUBCUTANEOUS at 18:33

## 2022-11-05 RX ADMIN — LEVALBUTEROL HYDROCHLORIDE SCH MG: 1.25 SOLUTION, CONCENTRATE RESPIRATORY (INHALATION) at 07:44

## 2022-11-05 RX ADMIN — Medication SCH MG: at 19:57

## 2022-11-05 RX ADMIN — Medication SCH MG: at 07:44

## 2022-11-05 RX ADMIN — HYDROXYUREA SCH MG: 500 CAPSULE ORAL at 17:00

## 2022-11-05 RX ADMIN — ENOXAPARIN SODIUM SCH MG: 80 INJECTION SUBCUTANEOUS at 06:09

## 2022-11-05 RX ADMIN — Medication SCH MG: at 01:42

## 2022-11-05 RX ADMIN — Medication SCH OZ: at 09:36

## 2022-11-05 RX ADMIN — ALLOPURINOL SCH MG: 100 TABLET ORAL at 17:00

## 2022-11-05 RX ADMIN — Medication SCH MG: at 13:47

## 2022-11-05 RX ADMIN — HYDROXYUREA SCH MG: 500 CAPSULE ORAL at 09:16

## 2022-11-05 RX ADMIN — FINASTERIDE SCH MG: 5 TABLET, FILM COATED ORAL at 09:17

## 2022-11-05 RX ADMIN — ATORVASTATIN CALCIUM SCH MG: 10 TABLET, FILM COATED ORAL at 21:09

## 2022-11-05 RX ADMIN — LEVALBUTEROL HYDROCHLORIDE SCH MG: 1.25 SOLUTION, CONCENTRATE RESPIRATORY (INHALATION) at 19:57

## 2022-11-05 RX ADMIN — LATANOPROST SCH DROP: 50 SOLUTION OPHTHALMIC at 21:51

## 2022-11-05 RX ADMIN — ALLOPURINOL SCH MG: 100 TABLET ORAL at 09:16

## 2022-11-05 RX ADMIN — PANTOPRAZOLE SODIUM SCH MG: 40 TABLET, DELAYED RELEASE ORAL at 07:58

## 2022-11-05 RX ADMIN — Medication SCH ML: at 12:25

## 2022-11-05 RX ADMIN — Medication SCH ML: at 17:00

## 2022-11-05 RX ADMIN — NITROGLYCERIN SCH GM: 20 OINTMENT TOPICAL at 21:54

## 2022-11-05 RX ADMIN — NITROGLYCERIN SCH GM: 20 OINTMENT TOPICAL at 09:16

## 2022-11-05 RX ADMIN — LEVALBUTEROL HYDROCHLORIDE SCH MG: 1.25 SOLUTION, CONCENTRATE RESPIRATORY (INHALATION) at 13:47

## 2022-11-05 RX ADMIN — TAMSULOSIN HYDROCHLORIDE SCH MG: 0.4 CAPSULE ORAL at 09:17

## 2022-11-05 RX ADMIN — ASPIRIN 81 MG SCH MG: 81 TABLET ORAL at 09:17

## 2022-11-05 NOTE — NUR
RN NOTE



PT WAS ASLEEP AND OUT THE WHOLE NIGHT. VS REMAINED STABLE. O2 SAT AT >97%.

ON SIMPLE MASK AT 8LPM.

ALL DUE MEDS GIVEN. TURNED AND REPOSITIONED.



WILL ENDORSE TO AM SHIFT NURSE FOR ADONIS.

## 2022-11-05 NOTE — NUR
TELE RN CLOSING NOTE

PT IN BED AOX3 ON SIMPLE MASK AT 8 LPM WITH CURRENT O2 SAT OF 95%. NO S/S OF ACUTE DISTRESS, 
TELE MONITOR SR HR 76,  IV ACCESS NOTED ON JACQUE ML,PATENT AND INTACT. ALL SAFETY MEASURES IN 
PLACE; BED LOCKED IN LOW POSITION, BED ALARM ON, CALL LIGHT WITHIN REACH, SIDE RAILS UP X3. 
WILL ENDORSE CONTINUITY OF CARE TO NIGHT SHIFT NURSE

## 2022-11-05 NOTE — NUR
tele rn notes 

Pts  noted  removing 0s  mask ,spoke to np nando  bilateral  soft wrist restraint  renewed 
,will continue to monitor pts.

## 2022-11-05 NOTE — NUR
TELE RN NOTE

 PATIENT IN BED, ALERT x1-2  CONFUSed, ON 8l SIMPLE MASK ,SATURATION 97% , NO SOB  NO 
DISTRESS NOTED, npo status   STILL WITH MOD AMT SECRETION  SUCTION DONE AS NEEDED,  ON TELE 
MONITOR  SR  AT THIS TIME,  RT UPPER ARM MID LINE IN PLACE AND FLUSHED WELL , BED IN 
LOWEST AND LOCKED POSITION , SAFETY MEASURE IMPLEMENTED, WILL CONT TO MONITOR

## 2022-11-06 VITALS — SYSTOLIC BLOOD PRESSURE: 120 MMHG | DIASTOLIC BLOOD PRESSURE: 63 MMHG

## 2022-11-06 VITALS — DIASTOLIC BLOOD PRESSURE: 66 MMHG | SYSTOLIC BLOOD PRESSURE: 115 MMHG

## 2022-11-06 VITALS — SYSTOLIC BLOOD PRESSURE: 116 MMHG | DIASTOLIC BLOOD PRESSURE: 70 MMHG

## 2022-11-06 VITALS — SYSTOLIC BLOOD PRESSURE: 139 MMHG | DIASTOLIC BLOOD PRESSURE: 76 MMHG

## 2022-11-06 VITALS — DIASTOLIC BLOOD PRESSURE: 87 MMHG | SYSTOLIC BLOOD PRESSURE: 136 MMHG

## 2022-11-06 VITALS — DIASTOLIC BLOOD PRESSURE: 67 MMHG | SYSTOLIC BLOOD PRESSURE: 119 MMHG

## 2022-11-06 LAB
BASOPHILS # BLD AUTO: 0 K/UL (ref 0–0.2)
BASOPHILS NFR BLD AUTO: 0.4 % (ref 0–2)
BUN SERPL-MCNC: 44 MG/DL (ref 7–18)
CALCIUM SERPL-MCNC: 8.3 MG/DL (ref 8.5–10.1)
CHLORIDE SERPL-SCNC: 115 MMOL/L (ref 98–107)
CO2 SERPL-SCNC: 33 MMOL/L (ref 21–32)
CREAT SERPL-MCNC: 0.9 MG/DL (ref 0.6–1.3)
EOSINOPHIL NFR BLD AUTO: 0 % (ref 0–6)
GLUCOSE SERPL-MCNC: 114 MG/DL (ref 74–106)
HCT VFR BLD AUTO: 42 % (ref 39–51)
HGB BLD-MCNC: 13.3 G/DL (ref 13.5–17.5)
LYMPHOCYTES NFR BLD AUTO: 0.1 K/UL (ref 0.8–4.8)
LYMPHOCYTES NFR BLD AUTO: 1.1 % (ref 20–44)
LYMPHOCYTES NFR BLD MANUAL: 2 % (ref 16–48)
MAGNESIUM SERPL-MCNC: 2.7 MG/DL (ref 1.8–2.4)
MCHC RBC AUTO-ENTMCNC: 32 G/DL (ref 31–36)
MCV RBC AUTO: 86 FL (ref 80–96)
MONOCYTES NFR BLD AUTO: 0.3 K/UL (ref 0.1–1.3)
MONOCYTES NFR BLD AUTO: 3.1 % (ref 2–12)
MONOCYTES NFR BLD MANUAL: 1 % (ref 0–11)
NEUTROPHILS # BLD AUTO: 10.3 K/UL (ref 1.8–8.9)
NEUTROPHILS NFR BLD AUTO: 95.4 % (ref 43–81)
NEUTS BAND NFR BLD MANUAL: 1 % (ref 0–5)
NEUTS SEG NFR BLD MANUAL: 96 % (ref 42–76)
PHOSPHATE SERPL-MCNC: 2.7 MG/DL (ref 2.5–4.9)
PLATELET # BLD AUTO: 260 K/UL (ref 150–450)
POTASSIUM SERPL-SCNC: 4.2 MMOL/L (ref 3.5–5.1)
RBC # BLD AUTO: 4.85 MIL/UL (ref 4.5–6)
SODIUM SERPL-SCNC: 153 MMOL/L (ref 136–145)
URATE SERPL-MCNC: 6.1 MG/DL (ref 2.6–7.2)
WBC NRBC COR # BLD AUTO: 10.8 K/UL (ref 4.3–11)

## 2022-11-06 RX ADMIN — Medication SCH ML: at 17:00

## 2022-11-06 RX ADMIN — LEVALBUTEROL HYDROCHLORIDE SCH MG: 1.25 SOLUTION, CONCENTRATE RESPIRATORY (INHALATION) at 20:11

## 2022-11-06 RX ADMIN — Medication SCH MG: at 12:55

## 2022-11-06 RX ADMIN — Medication SCH MG: at 07:22

## 2022-11-06 RX ADMIN — Medication SCH MG: at 20:11

## 2022-11-06 RX ADMIN — ATORVASTATIN CALCIUM SCH MG: 10 TABLET, FILM COATED ORAL at 22:00

## 2022-11-06 RX ADMIN — LATANOPROST SCH DROP: 50 SOLUTION OPHTHALMIC at 22:38

## 2022-11-06 RX ADMIN — ALLOPURINOL SCH MG: 100 TABLET ORAL at 17:00

## 2022-11-06 RX ADMIN — ALLOPURINOL SCH MG: 100 TABLET ORAL at 12:50

## 2022-11-06 RX ADMIN — SODIUM CHLORIDE SCH MG: 9 INJECTION, SOLUTION INTRAVENOUS at 08:03

## 2022-11-06 RX ADMIN — ENOXAPARIN SODIUM SCH MG: 80 INJECTION SUBCUTANEOUS at 06:06

## 2022-11-06 RX ADMIN — LEVALBUTEROL HYDROCHLORIDE SCH MG: 1.25 SOLUTION, CONCENTRATE RESPIRATORY (INHALATION) at 02:11

## 2022-11-06 RX ADMIN — HYDROXYUREA SCH MG: 500 CAPSULE ORAL at 09:00

## 2022-11-06 RX ADMIN — Medication SCH MG: at 02:11

## 2022-11-06 RX ADMIN — NITROGLYCERIN SCH GM: 20 OINTMENT TOPICAL at 11:15

## 2022-11-06 RX ADMIN — LEVALBUTEROL HYDROCHLORIDE SCH MG: 1.25 SOLUTION, CONCENTRATE RESPIRATORY (INHALATION) at 12:55

## 2022-11-06 RX ADMIN — Medication SCH OZ: at 18:21

## 2022-11-06 RX ADMIN — FINASTERIDE SCH MG: 5 TABLET, FILM COATED ORAL at 09:00

## 2022-11-06 RX ADMIN — NITROGLYCERIN SCH GM: 20 OINTMENT TOPICAL at 20:23

## 2022-11-06 RX ADMIN — LEVALBUTEROL HYDROCHLORIDE SCH MG: 1.25 SOLUTION, CONCENTRATE RESPIRATORY (INHALATION) at 07:22

## 2022-11-06 RX ADMIN — ENOXAPARIN SODIUM SCH MG: 80 INJECTION SUBCUTANEOUS at 20:22

## 2022-11-06 RX ADMIN — Medication SCH ML: at 08:00

## 2022-11-06 RX ADMIN — TAMSULOSIN HYDROCHLORIDE SCH MG: 0.4 CAPSULE ORAL at 09:00

## 2022-11-06 RX ADMIN — ALLOPURINOL SCH MG: 100 TABLET ORAL at 09:00

## 2022-11-06 RX ADMIN — ASPIRIN 81 MG SCH MG: 81 TABLET ORAL at 09:00

## 2022-11-06 NOTE — NUR
tele rn opening note 



received pt in bed, asleep.pt is on 10 L simple face mask saturating at 96%. no signs of 
pain or discomofrt at this time. PT HAS R UA MIDLINE.INTACT, IN PLACE AND FLUSHING WELL. PT 
ON BILATERAL SOFT WRIST RESTRAINTS. NO SKIN CIRCULATION ISSUES NOTED AT THIS TIME. ALL 
SAFETY MEASURES IN PLACE. CALL LIGHT WITHIN REACH.BED LOCKED AT LOWEST POSITION. BED ALARM 
ON, SIDE RAILS UP X2..

## 2022-11-06 NOTE — NUR
RECEIVED ON 10 LPM O2 FLOW VIA SIMPLE MASK WITH 93% SPO2.

-------------------------------------------------------------------------------

Addendum: 11/06/22 at 0742 by RAMAKRISHNA DUONG RT

-------------------------------------------------------------------------------

Amended: Links added.

## 2022-11-06 NOTE — NUR
RN OPENING NOTES: 



RECEIVED PT IN BED, AWAKE, ALERT AND ORIENTED X 1-2 AND VERBALLY RESPONSIVE. ON 10L/MIN  
SIMPLE FACE MASK AND PT TOLERATED WELL. PT STILL ON NPO STATUS. IV ACCESS ON JACQUE MIDLINE 
INTACT AND PATENT. NO S/S OF INFILTRATIONS. NO C/O PAIN OR DISCOMFORT. NO ACUTE DISTRESS.  
PT ON BILATERAL SOFT WRIST RESTRAINTS. ALL SAFETY MEASURES IN PLACE. PLACE CALL LIGHT WITHIN 
REACH. BED IN LOWEST POSITION AND LOCKED. SIDE RAILS UP X3. BED ALARM ON. WILL CONTINUE TO 
MONITOR

## 2022-11-06 NOTE — NUR
TELE RN CLOSING NOTE

PT REMAIN IN BED AOX1 ON SIMPLE MASK AT 8 LPM WITH CURRENT O2 SAT OF 95%. NO S/S OF ACUTE 
DISTRESS, TELE MONITOR SR HR 74,  IV ACCESS NOTED ON JACQUE ML,PATENT AND INTACT REMAINS  NPO . 
ALL SAFETY MEASURES IN PLACE; BED LOCKED IN LOW POSITION, BED ALARM ON, CALL LIGHT WITHIN 
REACH, SIDE RAILS UP X3. WILL ENDORSE CONTINUITY OF CARE TO AM SHIFT NURSE

## 2022-11-06 NOTE — NUR
TELE RN CLOSING NOTE 



PT AWAKE, ALERT AND ORIENTED X 1-2. PT ON 10 L SIMPLE FACE MASK TOLERATING AT 93%.PT IS 
CURRENTLY NPO STATUS. NO SIGNS OF PAIN/DISCOMFORT NOTED AT THIS TIME. JACQUE MIDLINE INTACT, 
PATENT AND FLUSHING WELL. PT BILATERAL SOFT WRIST RESTRAINTS. NO SKIN CIRCULATION ISSUES 
NOTED AT THIS TIME. ALL SAFETY MEASURES IN PLACE. CALL LIGHT WITHIN REACH. BED LOCKED AT 
LOWEST POSITION. SIDE RAILS UP X2. BED ALARM ON.

## 2022-11-07 VITALS — SYSTOLIC BLOOD PRESSURE: 127 MMHG | DIASTOLIC BLOOD PRESSURE: 78 MMHG

## 2022-11-07 VITALS — DIASTOLIC BLOOD PRESSURE: 68 MMHG | SYSTOLIC BLOOD PRESSURE: 120 MMHG

## 2022-11-07 VITALS — DIASTOLIC BLOOD PRESSURE: 61 MMHG | SYSTOLIC BLOOD PRESSURE: 123 MMHG

## 2022-11-07 VITALS — SYSTOLIC BLOOD PRESSURE: 124 MMHG | DIASTOLIC BLOOD PRESSURE: 66 MMHG

## 2022-11-07 VITALS — SYSTOLIC BLOOD PRESSURE: 121 MMHG | DIASTOLIC BLOOD PRESSURE: 67 MMHG

## 2022-11-07 VITALS — SYSTOLIC BLOOD PRESSURE: 125 MMHG | DIASTOLIC BLOOD PRESSURE: 73 MMHG

## 2022-11-07 LAB
BASOPHILS # BLD AUTO: 0 K/UL (ref 0–0.2)
BASOPHILS NFR BLD AUTO: 0.1 % (ref 0–2)
BUN SERPL-MCNC: 43 MG/DL (ref 7–18)
CALCIUM SERPL-MCNC: 8.6 MG/DL (ref 8.5–10.1)
CHLORIDE SERPL-SCNC: 117 MMOL/L (ref 98–107)
CO2 SERPL-SCNC: 34 MMOL/L (ref 21–32)
CREAT SERPL-MCNC: 1 MG/DL (ref 0.6–1.3)
EOSINOPHIL NFR BLD AUTO: 0 % (ref 0–6)
GLUCOSE SERPL-MCNC: 125 MG/DL (ref 74–106)
HCT VFR BLD AUTO: 41 % (ref 39–51)
HGB BLD-MCNC: 13.1 G/DL (ref 13.5–17.5)
LYMPHOCYTES NFR BLD AUTO: 0.2 K/UL (ref 0.8–4.8)
LYMPHOCYTES NFR BLD AUTO: 1.7 % (ref 20–44)
LYMPHOCYTES NFR BLD MANUAL: 4 % (ref 16–48)
MCHC RBC AUTO-ENTMCNC: 32 G/DL (ref 31–36)
MCV RBC AUTO: 86 FL (ref 80–96)
METAMYELOCYTES NFR BLD MANUAL: 1 % (ref 0–0)
MONOCYTES NFR BLD AUTO: 0.9 K/UL (ref 0.1–1.3)
MONOCYTES NFR BLD AUTO: 8.9 % (ref 2–12)
MONOCYTES NFR BLD MANUAL: 6 % (ref 0–11)
MYELOCYTES NFR BLD MANUAL: 1 % (ref 0–0)
NEUTROPHILS # BLD AUTO: 9.2 K/UL (ref 1.8–8.9)
NEUTROPHILS NFR BLD AUTO: 89.3 % (ref 43–81)
NEUTS BAND NFR BLD MANUAL: 6 % (ref 0–5)
NEUTS SEG NFR BLD MANUAL: 82 % (ref 42–76)
PLATELET # BLD AUTO: 285 K/UL (ref 150–450)
POTASSIUM SERPL-SCNC: 4.2 MMOL/L (ref 3.5–5.1)
RBC # BLD AUTO: 4.81 MIL/UL (ref 4.5–6)
SODIUM SERPL-SCNC: 155 MMOL/L (ref 136–145)
URATE SERPL-MCNC: 6.3 MG/DL (ref 2.6–7.2)
WBC NRBC COR # BLD AUTO: 10.3 K/UL (ref 4.3–11)

## 2022-11-07 RX ADMIN — NITROGLYCERIN SCH GM: 20 OINTMENT TOPICAL at 21:41

## 2022-11-07 RX ADMIN — Medication SCH MG: at 01:17

## 2022-11-07 RX ADMIN — LEVALBUTEROL HYDROCHLORIDE SCH MG: 1.25 SOLUTION, CONCENTRATE RESPIRATORY (INHALATION) at 20:29

## 2022-11-07 RX ADMIN — TAMSULOSIN HYDROCHLORIDE SCH MG: 0.4 CAPSULE ORAL at 09:00

## 2022-11-07 RX ADMIN — Medication SCH ML: at 07:54

## 2022-11-07 RX ADMIN — LEVALBUTEROL HYDROCHLORIDE SCH MG: 1.25 SOLUTION, CONCENTRATE RESPIRATORY (INHALATION) at 07:35

## 2022-11-07 RX ADMIN — SODIUM CHLORIDE SCH MG: 9 INJECTION, SOLUTION INTRAVENOUS at 07:46

## 2022-11-07 RX ADMIN — DEXTROSE AND SODIUM CHLORIDE SCH MLS/HR: 5; 450 INJECTION, SOLUTION INTRAVENOUS at 11:57

## 2022-11-07 RX ADMIN — ALLOPURINOL SCH MG: 100 TABLET ORAL at 09:00

## 2022-11-07 RX ADMIN — LEVALBUTEROL HYDROCHLORIDE SCH MG: 1.25 SOLUTION, CONCENTRATE RESPIRATORY (INHALATION) at 01:17

## 2022-11-07 RX ADMIN — Medication SCH MG: at 13:20

## 2022-11-07 RX ADMIN — Medication SCH MG: at 20:29

## 2022-11-07 RX ADMIN — ATORVASTATIN CALCIUM SCH MG: 10 TABLET, FILM COATED ORAL at 22:00

## 2022-11-07 RX ADMIN — ENOXAPARIN SODIUM SCH MG: 80 INJECTION SUBCUTANEOUS at 07:00

## 2022-11-07 RX ADMIN — Medication SCH ML: at 17:00

## 2022-11-07 RX ADMIN — ALLOPURINOL SCH MG: 100 TABLET ORAL at 12:32

## 2022-11-07 RX ADMIN — Medication SCH MG: at 07:35

## 2022-11-07 RX ADMIN — ENOXAPARIN SODIUM SCH MG: 80 INJECTION SUBCUTANEOUS at 19:00

## 2022-11-07 RX ADMIN — Medication SCH OZ: at 10:01

## 2022-11-07 RX ADMIN — DEXTROSE AND SODIUM CHLORIDE SCH MLS/HR: 5; 450 INJECTION, SOLUTION INTRAVENOUS at 21:40

## 2022-11-07 RX ADMIN — LEVALBUTEROL HYDROCHLORIDE SCH MG: 1.25 SOLUTION, CONCENTRATE RESPIRATORY (INHALATION) at 13:20

## 2022-11-07 RX ADMIN — FINASTERIDE SCH MG: 5 TABLET, FILM COATED ORAL at 09:00

## 2022-11-07 RX ADMIN — ASPIRIN 81 MG SCH MG: 81 TABLET ORAL at 09:00

## 2022-11-07 RX ADMIN — LATANOPROST SCH DROP: 50 SOLUTION OPHTHALMIC at 21:40

## 2022-11-07 RX ADMIN — NITROGLYCERIN SCH GM: 20 OINTMENT TOPICAL at 09:00

## 2022-11-07 NOTE — NUR
TELE RN CLOSING NOTE

PT REMAINS IN BED, ASLEEP BUT EASILY AROUSABLE A&O X2, CONFUSED. CONTINUES TO BE ON 10L 
SIMPLE FACE MASK WITH O2SAT RANGING FROM 93%- 94%; NO S/S OF RESP DISTRESS, NO SOB OR COUGH, 
NON-LABORED AND EQUAL BREATHING. ATTACHED TO EXTERNAL MONITOR, SR WITH HR RANGING FROM 80- 
93. JACQUE MIDLINE INTACT AND PATENT, FLUSHES EASILY WITH NO RESISTANCE; NO MEDS OR FLUIDS 
INFUSING THROUGH IT. BILATERAL SOFT WRIST RESTRAINTS REMAINS IN PLACE, NO SIGNS OF IMPAIRED 
SKIN OR CIRCULATION; PROVIDED PT WITH RELEASE OF RESTRAINTS AND HYGIENE. BED IN LOWEST 
POSITION, CALL LIGHT WITHIN REACH, SIDE RAILS UP X3. WILL ENDORSE TO DAYSHIFT NURSE TO 
CONTINUE CARE.

## 2022-11-07 NOTE — NUR
RN TELE OPENING NOTES:

RECEIVED PATIENT IN BED ASLEEP BUT EASILY AROUSES TO VOICE AND TACTILE STIMULI.  NO 
RESPIRATORY DISTRESS NOTED AT THIS TIME.  ON OXYGEN VIA SIMPLE MASK @ 10L/MIN, NO SOB NOTED, 
BREATHING EVEN AND UNLABORED.  PATIENT IS ALERT, ORIENTED X 2.  ON SR ON TELE MONITOR WITH 
HR OF 87.  RIGHT UPPER ARM MIDLINE IV ACCESS INTACT, PATENT, FLUSHES WELL, NO S/S 
INFILTRATION NOTED.  NO ACTIVE BLEEDING NOTED ON PATIENT'S RIGHT ARM BUT NOTED ECCHYMOSIS.  
BED LOCKED AND IN LOWEST POSITION.  HOB ELEVATED.  ALL SAFETY MEASURES IN PLACE.  CALL LIGHT 
WITHIN REACH.  WILL CONTINUE TO MONITOR PATIENT THROUGHOUT SHIFT.

## 2022-11-07 NOTE — NUR
RN TELE CLOSING NOTES:

PATIENT IN BED AWAKE, AND IN NO RESPIRATORY DISTRESS NOTED.  ON OXYGEN @ 10 L/MIN VIA MASK.  
ALL SAFETY MEASURES PROVIDED.  ALL NEEDS MET AND ANTICIPATED.  NOTED TO HAVE INTERMITTENT 
MINIMAL BLEEDING ON THE PATIENT'S ARM.  ON IV FLUID OF D5 1/2 NS @ 100 ML/HR VIA RIGHT UPPER 
ARM MIDLINE, NO S/S INFILTRATION NOTED.  BED LOCKED AND IN LOWEST POSITION.  ALL SAFETY 
MEASURES IMPLEMENTED.  WILL ENDORSE TO NEXT SHIFT NURSE FOR CONTINUITY OF CARE.

## 2022-11-07 NOTE — NUR
RN OPENING NOTES: 



RECEIVED PT IN BED, AWAKE, ALERT AND ORIENTED X 1-2 AND VERBALLY RESPONSIVE. ON 10L/MIN  
SIMPLE FACE MASK AND PT TOLERATED WELL. PT STILL ON NPO STATUS. IV ACCESS ON JACQUE MIDLINE 
INTACT AND PATENT. NO S/S OF INFILTRATIONS. D5 1/2 NS RUNNING 100CC/HR. STILL NOTED ON AND 
OFF BLEEDING FROM RT HAND. APPLIED PRESSURE. NO C/O PAIN OR DISCOMFORT. NO ACUTE DISTRESS.  
PT ON BILATERAL SOFT WRIST RESTRAINTS. ALL SAFETY MEASURES IN PLACE. PLACE CALL LIGHT WITHIN 
REACH. BED IN LOWEST POSITION AND LOCKED. SIDE RAILS UP X3. BED ALARM ON. WILL CONTINUE TO 
MONITOR

## 2022-11-08 VITALS — SYSTOLIC BLOOD PRESSURE: 117 MMHG | DIASTOLIC BLOOD PRESSURE: 64 MMHG

## 2022-11-08 VITALS — SYSTOLIC BLOOD PRESSURE: 111 MMHG | DIASTOLIC BLOOD PRESSURE: 49 MMHG

## 2022-11-08 VITALS — SYSTOLIC BLOOD PRESSURE: 138 MMHG | DIASTOLIC BLOOD PRESSURE: 59 MMHG

## 2022-11-08 VITALS — DIASTOLIC BLOOD PRESSURE: 49 MMHG | SYSTOLIC BLOOD PRESSURE: 111 MMHG

## 2022-11-08 VITALS — DIASTOLIC BLOOD PRESSURE: 62 MMHG | SYSTOLIC BLOOD PRESSURE: 136 MMHG

## 2022-11-08 LAB
BASOPHILS # BLD AUTO: 0 K/UL (ref 0–0.2)
BASOPHILS NFR BLD AUTO: 0.1 % (ref 0–2)
BUN SERPL-MCNC: 37 MG/DL (ref 7–18)
CALCIUM SERPL-MCNC: 8.6 MG/DL (ref 8.5–10.1)
CHLORIDE SERPL-SCNC: 119 MMOL/L (ref 98–107)
CO2 SERPL-SCNC: 37 MMOL/L (ref 21–32)
CREAT SERPL-MCNC: 0.9 MG/DL (ref 0.6–1.3)
EOSINOPHIL NFR BLD AUTO: 0.1 % (ref 0–6)
GLUCOSE SERPL-MCNC: 154 MG/DL (ref 74–106)
HCT VFR BLD AUTO: 37 % (ref 39–51)
HGB BLD-MCNC: 11.8 G/DL (ref 13.5–17.5)
LYMPHOCYTES NFR BLD AUTO: 0.3 K/UL (ref 0.8–4.8)
LYMPHOCYTES NFR BLD AUTO: 1.9 % (ref 20–44)
LYMPHOCYTES NFR BLD MANUAL: 3 % (ref 16–48)
MCHC RBC AUTO-ENTMCNC: 32 G/DL (ref 31–36)
MCV RBC AUTO: 86 FL (ref 80–96)
METAMYELOCYTES NFR BLD MANUAL: 1 % (ref 0–0)
MONOCYTES NFR BLD AUTO: 1.1 K/UL (ref 0.1–1.3)
MONOCYTES NFR BLD AUTO: 7.3 % (ref 2–12)
MONOCYTES NFR BLD MANUAL: 7 % (ref 0–11)
NEUTROPHILS # BLD AUTO: 13.3 K/UL (ref 1.8–8.9)
NEUTROPHILS NFR BLD AUTO: 90.6 % (ref 43–81)
NEUTS BAND NFR BLD MANUAL: 2 % (ref 0–5)
NEUTS SEG NFR BLD MANUAL: 87 % (ref 42–76)
PLATELET # BLD AUTO: 270 K/UL (ref 150–450)
POTASSIUM SERPL-SCNC: 3.6 MMOL/L (ref 3.5–5.1)
RBC # BLD AUTO: 4.29 MIL/UL (ref 4.5–6)
SODIUM SERPL-SCNC: 156 MMOL/L (ref 136–145)
URATE SERPL-MCNC: 5.8 MG/DL (ref 2.6–7.2)
WBC NRBC COR # BLD AUTO: 14.7 K/UL (ref 4.3–11)

## 2022-11-08 RX ADMIN — NITROGLYCERIN SCH GM: 20 OINTMENT TOPICAL at 09:16

## 2022-11-08 RX ADMIN — FINASTERIDE SCH MG: 5 TABLET, FILM COATED ORAL at 08:22

## 2022-11-08 RX ADMIN — SODIUM CHLORIDE SCH MG: 9 INJECTION, SOLUTION INTRAVENOUS at 07:33

## 2022-11-08 RX ADMIN — Medication SCH MG: at 12:48

## 2022-11-08 RX ADMIN — LEVALBUTEROL HYDROCHLORIDE SCH MG: 1.25 SOLUTION, CONCENTRATE RESPIRATORY (INHALATION) at 02:10

## 2022-11-08 RX ADMIN — ASPIRIN 81 MG SCH MG: 81 TABLET ORAL at 08:21

## 2022-11-08 RX ADMIN — ENOXAPARIN SODIUM SCH MG: 80 INJECTION SUBCUTANEOUS at 06:21

## 2022-11-08 RX ADMIN — LEVALBUTEROL HYDROCHLORIDE SCH MG: 1.25 SOLUTION, CONCENTRATE RESPIRATORY (INHALATION) at 08:22

## 2022-11-08 RX ADMIN — Medication SCH MG: at 08:22

## 2022-11-08 RX ADMIN — Medication SCH ML: at 07:42

## 2022-11-08 RX ADMIN — TAMSULOSIN HYDROCHLORIDE SCH MG: 0.4 CAPSULE ORAL at 08:22

## 2022-11-08 RX ADMIN — DEXTROSE AND SODIUM CHLORIDE SCH MLS/HR: 5; 450 INJECTION, SOLUTION INTRAVENOUS at 07:30

## 2022-11-08 RX ADMIN — LEVALBUTEROL HYDROCHLORIDE SCH MG: 1.25 SOLUTION, CONCENTRATE RESPIRATORY (INHALATION) at 12:48

## 2022-11-08 RX ADMIN — Medication SCH MG: at 02:10

## 2022-11-08 RX ADMIN — Medication SCH OZ: at 15:22

## 2022-11-08 NOTE — NUR
rn note 



patient discharged. gave report to EMT. patient stable condition. received call from patient 
son that belongings were not sent. patient son called and said they would  patient 
belongings after 7pm and bring back tele box.

## 2022-11-08 NOTE — NUR
tele rn opening note 



received pt in bed.pt alert and oriented x1-2..pt is on 10 L simple face mask saturating at 
92%. no signs of pain or discomfort at this time. PT HAS R UA MIDLINE.INTACT, IN PLACE AND 
FLUSHING WELL. pt has skin discoloration on right forearm. PT ON BILATERAL SOFT WRIST 
RESTRAINTS. NO SKIN CIRCULATION ISSUES NOTED AT THIS TIME. ALL SAFETY MEASURES IN PLACE. 
CALL LIGHT WITHIN REACH.BED LOCKED AT LOWEST POSITION. BED ALARM ON, SIDE RAILS UP X2.

## 2022-11-08 NOTE — NUR
RN CLOSING NOTES: 



PT IN BED, AWAKE, ALERT AND ORIENTED X 1-2 AND VERBALLY RESPONSIVE. ON 10L/MIN  SIMPLE FACE 
MASK AND PT TOLERATED WELL. STILL ON NPO STATUS. NOSIGNIFICANT CHANGES DURING THIS SHIFT. IV 
ACCESS ON JACQUE MIDLINE INTACT AND PATENT. NO S/S OF INFILTRATIONS. D5 1/2 NS RUNNING 
100CC/HR. NOTED ON AND OFF BLEEDING FROM RT HAND. HOLD LOVENOX. NO C/O PAIN OR DISCOMFORT. 
NO ACUTE DISTRESS.  PT ON BILATERAL SOFT WRIST RESTRAINTS. RELEASED Q 2 HOURS TO CHECK 
CIRCULATION.  ALL SAFETY MEASURES IN PLACE. PLACE CALL LIGHT WITHIN REACH. BED IN LOWEST 
POSITION AND LOCKED. SIDE RAILS UP X3. BED ALARM ON. WILL ENDORSE TO MORNING SHIFT NURSE.

## 2022-11-08 NOTE — NUR
rn note 



patient discharged. gave report to EMT. patient stable condition. returned pt 
belongings.discharged home with hospice

## 2022-11-08 NOTE — NUR
rn note 



patient discharged. gave report to EMT. patient stable condition. received call from patient 
son that belongings were sent. patient son called and said they would  patient 
belongings after 7pm and bring back tele box.

-------------------------------------------------------------------------------

Addendum: 11/08/22 at 1803 by ARIELLE ARIAS RN

-------------------------------------------------------------------------------

pt belongings returned to family member and tele box was returned

## 2022-11-08 NOTE — NUR
rn note



spoke with son Chrissy. wants lovenox to be stopped due to bleeding and brusing on arm and 
patient will be on hospice